# Patient Record
Sex: FEMALE | Race: OTHER | HISPANIC OR LATINO | Employment: OTHER | URBAN - METROPOLITAN AREA
[De-identification: names, ages, dates, MRNs, and addresses within clinical notes are randomized per-mention and may not be internally consistent; named-entity substitution may affect disease eponyms.]

---

## 2017-01-11 ENCOUNTER — TELEPHONE (OUTPATIENT)
Dept: INTERNAL MEDICINE | Facility: MEDICAL CENTER | Age: 67
End: 2017-01-11

## 2017-01-11 NOTE — TELEPHONE ENCOUNTER
----- Message from Nadya Woods sent at 1/11/2017  2:37 PM PST -----  Regarding: NICOLE - LAB ORDER REQUEST  Contact: [phone]  Patient has an appt in Feb and would like to have routine labs done prior to appt. Please mail orders to home address.

## 2017-01-12 NOTE — TELEPHONE ENCOUNTER
Phone Number Called: [phone]    Message: Called pt, no answer, left message with info as stated below in Dr. Brown's note.    Left Message for patient to call back: yes, with any questions.

## 2017-02-10 ENCOUNTER — OFFICE VISIT (OUTPATIENT)
Dept: INTERNAL MEDICINE | Facility: MEDICAL CENTER | Age: 67
End: 2017-02-10
Payer: MEDICARE

## 2017-02-10 VITALS
TEMPERATURE: 98.4 F | SYSTOLIC BLOOD PRESSURE: 122 MMHG | HEIGHT: 61 IN | OXYGEN SATURATION: 96 % | WEIGHT: 117.25 LBS | DIASTOLIC BLOOD PRESSURE: 76 MMHG | BODY MASS INDEX: 22.14 KG/M2 | HEART RATE: 75 BPM

## 2017-02-10 DIAGNOSIS — L50.9 URTICARIA: ICD-10-CM

## 2017-02-10 DIAGNOSIS — Z13.220 LIPID SCREENING: ICD-10-CM

## 2017-02-10 DIAGNOSIS — Z13.1 DIABETES MELLITUS SCREENING: ICD-10-CM

## 2017-02-10 DIAGNOSIS — K21.9 GASTROESOPHAGEAL REFLUX DISEASE WITHOUT ESOPHAGITIS: ICD-10-CM

## 2017-02-10 DIAGNOSIS — F17.200 TOBACCO DEPENDENCE: ICD-10-CM

## 2017-02-10 PROCEDURE — 99214 OFFICE O/P EST MOD 30 MIN: CPT | Performed by: INTERNAL MEDICINE

## 2017-02-10 PROCEDURE — 4040F PNEUMOC VAC/ADMIN/RCVD: CPT | Performed by: INTERNAL MEDICINE

## 2017-02-10 PROCEDURE — 3017F COLORECTAL CA SCREEN DOC REV: CPT | Performed by: INTERNAL MEDICINE

## 2017-02-10 PROCEDURE — G8432 DEP SCR NOT DOC, RNG: HCPCS | Performed by: INTERNAL MEDICINE

## 2017-02-10 PROCEDURE — 4004F PT TOBACCO SCREEN RCVD TLK: CPT | Mod: 8P | Performed by: INTERNAL MEDICINE

## 2017-02-10 PROCEDURE — G8419 CALC BMI OUT NRM PARAM NOF/U: HCPCS | Performed by: INTERNAL MEDICINE

## 2017-02-10 PROCEDURE — 1101F PT FALLS ASSESS-DOCD LE1/YR: CPT | Performed by: INTERNAL MEDICINE

## 2017-02-10 PROCEDURE — 3014F SCREEN MAMMO DOC REV: CPT | Performed by: INTERNAL MEDICINE

## 2017-02-10 PROCEDURE — G8482 FLU IMMUNIZE ORDER/ADMIN: HCPCS | Performed by: INTERNAL MEDICINE

## 2017-02-10 RX ORDER — OMEPRAZOLE 20 MG/1
20 CAPSULE, DELAYED RELEASE ORAL DAILY
Qty: 30 CAP | Refills: 6 | Status: SHIPPED | OUTPATIENT
Start: 2017-02-10 | End: 2017-09-20 | Stop reason: SDUPTHER

## 2017-02-10 NOTE — PROGRESS NOTES
Chief Complaint   Patient presents with   • Abdominal Pain     Mid-upper abd [id number]. Sometimes/barely       HISTORY OF PRESENT ILLNESS: Patient is a 66 y.o. female established patient who presents along with her sister today for the following.      1. Gastroesophageal reflux disease without esophagitis  Complaint of stomach bloating and upset pretty often on daily basis. The pain goes to the back from midepigastric region. Denies having any blood in stools or black stools. Has been taking ranitidine when needed for heartburn. Does drink wine on a regular basis and smokes. Does not cut down spicy foods in general    2. Urticaria  Complaint of rash on her arms occasionally and itchy. Does not know what's the predisposing reason. Has not maintained a food log. No prior history of rash. Denies any new perfumes, over the counter medications, or detergents.    3. Lipid screening  Would like to get cholesterol checked and other labs    4. Diabetes mellitus screening  Family history of diabetes in her brother and sister and would like to get screened for diabetes    5. Tobacco dependence  Continues to smoke half a pack a day. Denies any history of weight loss or lung cancer in the family.      Past Medical History   Diagnosis Date   • Foot pain, left    • Menopause    • UTI (urinary tract infection)    • Cervical radiculopathy    • Insomnia, psychophysiological    • High risk sexual behavior    • Tobacco dependence        Patient Active Problem List    Diagnosis Date Noted   • Urticaria 02/10/2017   • Chronic cervical radiculopathy 11/16/2016   • Gastroesophageal reflux disease without esophagitis 11/16/2016   • Insomnia 05/10/2016   • Tobacco dependence 05/10/2016       Allergies:Review of patient's allergies indicates no known allergies.    Current Outpatient Prescriptions   Medication Sig Dispense Refill   • aspirin 81 MG tablet Take 81 mg by mouth every day.     • omeprazole (PRILOSEC) 20 MG delayed-release capsule  "Take 1 Cap by mouth every day. 30 Cap 6   • Omega-3 Fatty Acids (FISH OIL) 1000 MG Cap capsule Take 1,000 mg by mouth every day.     • diphenhydrAMINE (BENADRYL) 25 MG Tab Take 25 mg by mouth every 6 hours as needed for Sleep.     • FLUZONE HIGH-DOSE 0.5 ML Suspension Prefilled Syringe injection TO BE ADMINISTERED BY PHARMACIST FOR IMMUNIZATION  0     No current facility-administered medications for this visit.       Social History   Substance Use Topics   • Smoking status: Current Every Day Smoker -- 0.25 packs/day for 4 years     Types: Cigarettes   • Smokeless tobacco: Never Used   • Alcohol Use: 1.2 oz/week     2 Glasses of wine per week       History reviewed. No pertinent family history.      Review of Systems    Patient denies Fevers/chills/nausea/vomiting/chest pain/sob/blood in stools/black stools/blood in urine. See HPI    Exam:  Blood pressure 122/76, pulse 75, temperature 36.9 °C (98.4 °F), height 1.537 m (5' 0.51\"), weight 53.184 kg (117 lb 4 oz), SpO2 96 %. Body mass index is 22.51 kg/(m^2).  Constitutional:  NAD, well appearing.  HEENT:   NC/AT  Cardiovascular: RRR.   No m/r/g. No carotid bruits.       Lungs:   CTAB, no w/r/r, no respiratory distress.  Abdomen: Soft, NT/ND + BS, no masses, no suprapubic tenderness, no hepatomegaly.  Extremities:  2+ DP and radial pulses bilaterally.  No c/c/e.  Skin:  Warm and dry.    Neurologic: Alert & oriented x 3, CN II-XII grossly intact, strength and sensation grossly intact.  No focal deficits noted.  Psychiatric:  Affect normal, mood normal, judgment normal.    Assessment/Plan:     1. Gastroesophageal reflux disease without esophagitis  Discussed about cutting down on spicy foods, chocolate, Stewart and stopping smoking. Patient is planning to try and meanwhile try omeprazole every morning on empty stomach. Reassess next visit  - CBC WITH DIFFERENTIAL; Future  - omeprazole (PRILOSEC) 20 MG delayed-release capsule; Take 1 Cap by mouth every day.  Dispense: 30 Cap; " Refill: 6    2. Urticaria  Currently no rash visible and occasional on and off symptoms of urticaria per patient's description. Discussed about trying Claritin at night on daily basis for calming down the symptoms and maintain a food log to see what's causing urticaria.  - CBC WITH DIFFERENTIAL; Future    3. Lipid screening  Tries to eat healthy and stays active but does not have any new problems with heart disease in the family.  - LIPID PROFILE; Future    4. Diabetes mellitus screening  Family history of diabetes strongly but herself no new symptoms of polyuria or polydipsia but we will screen. Fasting blood sugars  - COMP METABOLIC PANEL; Future    5. Tobacco dependence  Discussed about smoking cessation and over-the-counter nicotine patches and if she is interested  We'll try Wellbutrin to help smoking cessation or Chantix when she is ready.      Followup: Return in about 4 months (around 6/10/2017).    Please note that this dictation was created using voice recognition software. I have made every reasonable attempt to correct obvious errors, but I expect that there are errors of grammar and possibly content that I did not discover before finalizing the note.

## 2017-02-10 NOTE — PATIENT INSTRUCTIONS
Food Choices for Gastroesophageal Reflux Disease, Adult  When you have gastroesophageal reflux disease (GERD), the foods you eat and your eating habits are very important. Choosing the right foods can help ease the discomfort of GERD.  WHAT GENERAL GUIDELINES DO I NEED TO FOLLOW?  Choose fruits, vegetables, whole grains, low-fat dairy products, and low-fat meat, fish, and poultry.  Limit fats such as oils, salad dressings, butter, nuts, and avocado.  Keep a food diary to identify foods that cause symptoms.  Avoid foods that cause reflux. These may be different for different people.  Eat frequent small meals instead of three large meals each day.  Eat your meals slowly, in a relaxed setting.  Limit fried foods.  Cook foods using methods other than frying.  Avoid drinking alcohol.  Avoid drinking large amounts of liquids with your meals.  Avoid bending over or lying down until 2-3 hours after eating.  WHAT FOODS ARE NOT RECOMMENDED?  The following are some foods and drinks that may worsen your symptoms:  Vegetables  Tomatoes. Tomato juice. Tomato and spaghetti sauce. Chili peppers. Onion and garlic. Horseradish.  Fruits  Oranges, grapefruit, and lemon (fruit and juice).  Meats  High-fat meats, fish, and poultry. This includes hot dogs, ribs, ham, sausage, salami, and muñoz.  Dairy  Whole milk and chocolate milk. Sour cream. Cream. Butter. Ice cream. Cream cheese.   Beverages  Coffee and tea, with or without caffeine. Carbonated beverages or energy drinks.  Condiments  Hot sauce. Barbecue sauce.   Sweets/Desserts  Chocolate and cocoa. Donuts. Peppermint and spearmint.  Fats and Oils  High-fat foods, including French fries and potato chips.  Other  Vinegar. Strong spices, such as black pepper, white pepper, red pepper, cayenne, copeland powder, cloves, ginger, and chili powder.  The items listed above may not be a complete list of foods and beverages to avoid. Contact your dietitian for more information.     This  information is not intended to replace advice given to you by your health care provider. Make sure you discuss any questions you have with your health care provider.     Document Released: 12/18/2006 Document Revised: 01/08/2016 Document Reviewed: 10/22/2014  [employer] Interactive Patient Education ©2016 [employer] Inc.   Horseradish.  Fruits  Oranges, grapefruit, and lemon (fruit and juice).  Meats  High-fat meats, fish, and poultry. This includes hot dogs, ribs, ham, sausage, salami, and muñoz.  Dairy  Whole milk and chocolate milk. Sour cream. Cream. Butter. Ice cream. Cream cheese.   Beverages  Coffee and tea, with or without caffeine. Carbonated beverages or energy drinks.  Condiments  Hot sauce. Barbecue sauce.   Sweets/Desserts  Chocolate and cocoa. Donuts. Peppermint and spearmint.  Fats and Oils  High-fat foods, including French fries and potato chips.  Other  Vinegar. Strong spices, such as black pepper, white pepper, red pepper, cayenne, copeland powder, cloves, ginger, and chili powder.  The items listed above may not be a complete list of foods and beverages to avoid. Contact your dietitian for more information.     This information is not intended to replace advice given to you by your health care provider. Make sure you discuss any questions you have with your health care provider.     Document Released: 12/18/2006 Document Revised: 01/08/2016 Document Reviewed: 10/22/2014  [employer] Patient Education ©2016 Elsevier Inc.

## 2017-02-10 NOTE — MR AVS SNAPSHOT
"        Yuly Wangari   2/10/2017 3:20 PM   Office Visit   MRN: [id number]    Department:  [facility] Med - Internal Med   Dept Phone:  [phone]    Description:  Female : 1950   Provider:  Génessi Brown M.D.           Reason for Visit     Abdominal Pain Mid-upper abd [id number]. Sometimes/barely      Allergies as of 2/10/2017     No Known Allergies      You were diagnosed with     Gastroesophageal reflux disease without esophagitis   [517997]       Urticaria   [9902061]       Lipid screening   [632608]       Diabetes mellitus screening   [460188]       Tobacco dependence   [168036]         Vital Signs     Blood Pressure Pulse Temperature Height Weight Body Mass Index    122/76 mmHg 75 36.9 °C (98.4 °F) 1.537 m (5' 0.51\") 53.184 kg (117 lb 4 oz) 22.51 kg/m2    Oxygen Saturation Smoking Status                96% Current Every Day Smoker          Basic Information     Date Of Birth Sex Race Ethnicity Preferred Language    1950 Female Unknown Unknown English      Your appointments     2017  3:20 PM   Established Patient with Génesis Brown M.D.   [facility] Med - Internal Medicine (--)    [address] [place] [address] [id number] [phone]           You will be receiving a confirmation call a few days before your appointment from our automated call confirmation system.              Problem List              ICD-10-CM Priority Class Noted - Resolved    Insomnia G47.00   5/10/2016 - Present    Tobacco dependence F17.200   5/10/2016 - Present    Chronic cervical radiculopathy M54.12   2016 - Present    Gastroesophageal reflux disease without esophagitis K21.9   2016 - Present    Urticaria L50.9   2/10/2017 - Present      Health Maintenance        Date Due Completion Dates    IMM DTaP/Tdap/Td Vaccine (1 - Tdap) 1967 ---    PAP SMEAR 1969 ---    IMM ZOSTER VACCINE 2008 ---    BONE DENSITY 2013 ---    MAMMOGRAM 2016    IMM PNEUMOCOCCAL " 65+ (ADULT) LOW/MEDIUM RISK SERIES (2 of 2 - PPSV23) 11/16/2017 11/16/2016    COLONOSCOPY 11/1/2023 11/1/2013 (Done)    Override on 11/1/2013: Done (in perla per pt)            Current Immunizations     13-VALENT PCV PREVNAR 11/16/2016    Influenza Vaccine Quad Inj (Preserved) 10/11/2016      Below and/or attached are the medications your provider expects you to take. Review all of your home medications and newly ordered medications with your provider and/or pharmacist. Follow medication instructions as directed by your provider and/or pharmacist. Please keep your medication list with you and share with your provider. Update the information when medications are discontinued, doses are changed, or new medications (including over-the-counter products) are added; and carry medication information at all times in the event of emergency situations     Allergies:  No Known Allergies          Medications  Valid as of: February 10, 2017 -  3:53 PM    Generic Name Brand Name Tablet Size Instructions for use    Aspirin (Tab) aspirin 81 MG Take 81 mg by mouth every day.        DiphenhydrAMINE HCl (Tab) BENADRYL 25 MG Take 25 mg by mouth every 6 hours as needed for Sleep.        Influenza Vac Split High-Dose (Suspension Prefilled Syringe) FLUZONE HIGH-DOSE 0.5 ML TO BE ADMINISTERED BY PHARMACIST FOR IMMUNIZATION        Omega-3 Fatty Acids (Cap) fish oil 1000 MG Take 1,000 mg by mouth every day.        Omeprazole (CAPSULE DELAYED RELEASE) PRILOSEC 20 MG Take 1 Cap by mouth every day.        .                 Medicines prescribed today were sent to:     [facility][id number] - [place] - [address] [place] [address]    Phone: [phone] Fax: [phone]    Open 24 Hours?: No      Medication refill instructions:       If your prescription bottle indicates you have medication refills left, it is not necessary to call your provider’s office. Please contact your pharmacy and they will refill your medication.    If your  prescription bottle indicates you do not have any refills left, you may request refills at any time through one of the following ways: The online Zero9 system (except Urgent Care), by calling your provider’s office, or by asking your pharmacy to contact your provider’s office with a refill request. Medication refills are processed only during regular business hours and may not be available until the next business day. Your provider may request additional information or to have a follow-up visit with you prior to refilling your medication.   *Please Note: Medication refills are assigned a new Rx number when refilled electronically. Your pharmacy may indicate that no refills were authorized even though a new prescription for the same medication is available at the pharmacy. Please request the medicine by name with the pharmacy before contacting your provider for a refill.        Your To Do List     Future Labs/Procedures Complete By Expires    CBC WITH DIFFERENTIAL  As directed 2/10/2018    COMP METABOLIC PANEL  As directed 2/10/2018    LIPID PROFILE  As directed 2/10/2018      Instructions    Food Choices for Gastroesophageal Reflux Disease, Adult  When you have gastroesophageal reflux disease (GERD), the foods you eat and your eating habits are very important. Choosing the right foods can help ease the discomfort of GERD.  WHAT GENERAL GUIDELINES DO I NEED TO FOLLOW?  Choose fruits, vegetables, whole grains, low-fat dairy products, and low-fat meat, fish, and poultry.  Limit fats such as oils, salad dressings, butter, nuts, and avocado.  Keep a food diary to identify foods that cause symptoms.  Avoid foods that cause reflux. These may be different for different people.  Eat frequent small meals instead of three large meals each day.  Eat your meals slowly, in a relaxed setting.  Limit fried foods.  Cook foods using methods other than frying.  Avoid drinking alcohol.  Avoid drinking large amounts of liquids with  your meals.  Avoid bending over or lying down until 2-3 hours after eating.  WHAT FOODS ARE NOT RECOMMENDED?  The following are some foods and drinks that may worsen your symptoms:  Vegetables  Tomatoes. Tomato juice. Tomato and spaghetti sauce. Chili peppers. Onion and garlic. Horseradish.  Fruits  Oranges, grapefruit, and lemon (fruit and juice).  Meats  High-fat meats, fish, and poultry. This includes hot dogs, ribs, ham, sausage, salami, and muñoz.  Dairy  Whole milk and chocolate milk. Sour cream. Cream. Butter. Ice cream. Cream cheese.   Beverages  Coffee and tea, with or without caffeine. Carbonated beverages or energy drinks.  Condiments  Hot sauce. Barbecue sauce.   Sweets/Desserts  Chocolate and cocoa. Donuts. Peppermint and spearmint.  Fats and Oils  High-fat foods, including French fries and potato chips.  Other  Vinegar. Strong spices, such as black pepper, white pepper, red pepper, cayenne, copeland powder, cloves, ginger, and chili powder.  The items listed above may not be a complete list of foods and beverages to avoid. Contact your dietitian for more information.     This information is not intended to replace advice given to you by your health care provider. Make sure you discuss any questions you have with your health care provider.     Document Released: 12/18/2006 Document Revised: 01/08/2016 Document Reviewed: 10/22/2014  [employer] Interactive Patient Education ©2016 [employer]   Horseradish.  Fruits  Oranges, grapefruit, and lemon (fruit and juice).  Meats  High-fat meats, fish, and poultry. This includes hot dogs, ribs, ham, sausage, salami, and muñoz.  Dairy  Whole milk and chocolate milk. Sour cream. Cream. Butter. Ice cream. Cream cheese.   Beverages  Coffee and tea, with or without caffeine. Carbonated beverages or energy drinks.  Condiments  Hot sauce. Barbecue sauce.   Sweets/Desserts  Chocolate and cocoa. Donuts. Peppermint and spearmint.  Fats and Oils  High-fat foods, including  French fries and potato chips.  Other  Vinegar. Strong spices, such as black pepper, white pepper, red pepper, cayenne, copeland powder, cloves, ginger, and chili powder.  The items listed above may not be a complete list of foods and beverages to avoid. Contact your dietitian for more information.     This information is not intended to replace advice given to you by your health care provider. Make sure you discuss any questions you have with your health care provider.     Document Released: 12/18/2006 Document Revised: 01/08/2016 Document Reviewed: 10/22/2014  [employer] Interactive Patient Education ©2016 Elsevier Inc.            5minutes Access Code: [email or web address]  Expires: 3/12/2017  2:39 PM    5minutes  A secure, online tool to manage your health information     [employer] 5minutes® is a secure, online tool that connects you to your personalized health information from the privacy of your home -- day or night - making it very easy for you to manage your healthcare. Once the activation process is completed, you can even access your medical information using the 5minutes ezequiel, which is available for free in the Apple Ezequiel store or Google Play store.     5minutes provides the following levels of access (as shown below):   My Chart Features   [facility] Doctor [facility]  Specialists [facility]  Urgent  Care Non-[facility]  Primary Care  Doctor   Email your healthcare team securely and privately 24/7 X X X    Manage appointments: schedule your next appointment; view details of past/upcoming appointments X      Request prescription refills. X      View recent personal medical records, including lab and immunizations X X X X   View health record, including health history, allergies, medications X X X X   Read reports about your outpatient visits, procedures, consult and ER notes X X X X   See your discharge summary, which is a recap of your hospital and/or ER visit that includes your diagnosis, lab results, and care plan.  X X       How to register for [employer]:  1. Go to  [email or web address].  2. Click on the Sign Up Now box, which takes you to the New Member Sign Up page. You will need to provide the following information:  a. Enter your [employer] Access Code exactly as it appears at the top of this page. (You will not need to use this code after you’ve completed the sign-up process. If you do not sign up before the expiration date, you must request a new code.)   b. Enter your date of birth.   c. Enter your home email address.   d. Click Submit, and follow the next screen’s instructions.  3. Create a YellowHammert ID. This will be your [employer] login ID and cannot be changed, so think of one that is secure and easy to remember.  4. Create a [employer] password. You can change your password at any time.  5. Enter your Password Reset Question and Answer. This can be used at a later time if you forget your password.   6. Enter your e-mail address. This allows you to receive e-mail notifications when new information is available in [employer]is.  7. Click Sign Up. You can now view your health information.    For assistance activating your [employer] account, call [phone]

## 2017-09-20 DIAGNOSIS — K21.9 GASTROESOPHAGEAL REFLUX DISEASE WITHOUT ESOPHAGITIS: ICD-10-CM

## 2017-09-20 RX ORDER — OMEPRAZOLE 20 MG/1
20 CAPSULE, DELAYED RELEASE ORAL DAILY
Qty: 30 CAP | Refills: 0 | Status: SHIPPED | OUTPATIENT
Start: 2017-09-20 | End: 2017-10-13 | Stop reason: SDUPTHER

## 2017-09-20 NOTE — TELEPHONE ENCOUNTER
Last seen: 02/10/17 by Dr. Brown  Next appt: 11/03/17 with Dr. Brown    Was the patient seen in the last year in this department? Yes   Does patient have an active prescription for medications requested? No   Received Request Via: Pharmacy

## 2017-10-13 DIAGNOSIS — K21.9 GASTROESOPHAGEAL REFLUX DISEASE WITHOUT ESOPHAGITIS: ICD-10-CM

## 2017-10-17 RX ORDER — OMEPRAZOLE 20 MG/1
20 CAPSULE, DELAYED RELEASE ORAL DAILY
Qty: 30 CAP | Refills: 0 | Status: SHIPPED | OUTPATIENT
Start: 2017-10-17 | End: 2017-11-03

## 2017-10-31 ENCOUNTER — HOSPITAL ENCOUNTER (OUTPATIENT)
Dept: LAB | Facility: MEDICAL CENTER | Age: 67
End: 2017-10-31
Attending: INTERNAL MEDICINE
Payer: MEDICARE

## 2017-10-31 DIAGNOSIS — L50.9 URTICARIA: ICD-10-CM

## 2017-10-31 DIAGNOSIS — Z13.220 LIPID SCREENING: ICD-10-CM

## 2017-10-31 DIAGNOSIS — Z13.1 DIABETES MELLITUS SCREENING: ICD-10-CM

## 2017-10-31 DIAGNOSIS — K21.9 GASTROESOPHAGEAL REFLUX DISEASE WITHOUT ESOPHAGITIS: ICD-10-CM

## 2017-10-31 LAB
ALBUMIN SERPL BCP-MCNC: 4.3 G/DL (ref 3.2–4.9)
ALBUMIN/GLOB SERPL: 1.5 G/DL
ALP SERPL-CCNC: 40 U/L (ref 30–99)
ALT SERPL-CCNC: 12 U/L (ref 2–50)
ANION GAP SERPL CALC-SCNC: 7 MMOL/L (ref 0–11.9)
AST SERPL-CCNC: 20 U/L (ref 12–45)
BASOPHILS # BLD AUTO: 0.8 % (ref 0–1.8)
BASOPHILS # BLD: 0.05 K/UL (ref 0–0.12)
BILIRUB SERPL-MCNC: 0.7 MG/DL (ref 0.1–1.5)
BUN SERPL-MCNC: 5 MG/DL (ref 8–22)
CALCIUM SERPL-MCNC: 9.4 MG/DL (ref 8.5–10.5)
CHLORIDE SERPL-SCNC: 94 MMOL/L (ref 96–112)
CO2 SERPL-SCNC: 28 MMOL/L (ref 20–33)
CREAT SERPL-MCNC: 0.48 MG/DL (ref 0.5–1.4)
EOSINOPHIL # BLD AUTO: 0.15 K/UL (ref 0–0.51)
EOSINOPHIL NFR BLD: 2.4 % (ref 0–6.9)
ERYTHROCYTE [DISTWIDTH] IN BLOOD BY AUTOMATED COUNT: 43.8 FL (ref 35.9–50)
GFR SERPL CREATININE-BSD FRML MDRD: >60 ML/MIN/1.73 M 2
GLOBULIN SER CALC-MCNC: 2.8 G/DL (ref 1.9–3.5)
GLUCOSE SERPL-MCNC: 88 MG/DL (ref 65–99)
HCT VFR BLD AUTO: 40.1 % (ref 37–47)
HGB BLD-MCNC: 13.3 G/DL (ref 12–16)
IMM GRANULOCYTES # BLD AUTO: 0.02 K/UL (ref 0–0.11)
IMM GRANULOCYTES NFR BLD AUTO: 0.3 % (ref 0–0.9)
LYMPHOCYTES # BLD AUTO: 1.84 K/UL (ref 1–4.8)
LYMPHOCYTES NFR BLD: 29.3 % (ref 22–41)
MCH RBC QN AUTO: 30.2 PG (ref 27–33)
MCHC RBC AUTO-ENTMCNC: 33.2 G/DL (ref 33.6–35)
MCV RBC AUTO: 90.9 FL (ref 81.4–97.8)
MONOCYTES # BLD AUTO: 0.53 K/UL (ref 0–0.85)
MONOCYTES NFR BLD AUTO: 8.5 % (ref 0–13.4)
NEUTROPHILS # BLD AUTO: 3.68 K/UL (ref 2–7.15)
NEUTROPHILS NFR BLD: 58.7 % (ref 44–72)
NRBC # BLD AUTO: 0 K/UL
NRBC BLD AUTO-RTO: 0 /100 WBC
PLATELET # BLD AUTO: 249 K/UL (ref 164–446)
PMV BLD AUTO: 10.7 FL (ref 9–12.9)
POTASSIUM SERPL-SCNC: 4.1 MMOL/L (ref 3.6–5.5)
PROT SERPL-MCNC: 7.1 G/DL (ref 6–8.2)
RBC # BLD AUTO: 4.41 M/UL (ref 4.2–5.4)
SODIUM SERPL-SCNC: 129 MMOL/L (ref 135–145)
WBC # BLD AUTO: 6.3 K/UL (ref 4.8–10.8)

## 2017-10-31 PROCEDURE — 85025 COMPLETE CBC W/AUTO DIFF WBC: CPT

## 2017-10-31 PROCEDURE — 80053 COMPREHEN METABOLIC PANEL: CPT

## 2017-10-31 PROCEDURE — 36415 COLL VENOUS BLD VENIPUNCTURE: CPT

## 2017-11-03 ENCOUNTER — OFFICE VISIT (OUTPATIENT)
Dept: INTERNAL MEDICINE | Facility: MEDICAL CENTER | Age: 67
End: 2017-11-03
Payer: MEDICARE

## 2017-11-03 VITALS
OXYGEN SATURATION: 97 % | SYSTOLIC BLOOD PRESSURE: 138 MMHG | TEMPERATURE: 97.4 F | WEIGHT: 116 LBS | DIASTOLIC BLOOD PRESSURE: 68 MMHG | HEIGHT: 61 IN | BODY MASS INDEX: 21.9 KG/M2 | HEART RATE: 82 BPM

## 2017-11-03 DIAGNOSIS — F17.200 TOBACCO DEPENDENCE: ICD-10-CM

## 2017-11-03 DIAGNOSIS — R53.83 OTHER FATIGUE: Primary | ICD-10-CM

## 2017-11-03 DIAGNOSIS — Z78.0 ASYMPTOMATIC MENOPAUSAL STATE: ICD-10-CM

## 2017-11-03 DIAGNOSIS — Z12.39 SCREENING FOR BREAST CANCER: ICD-10-CM

## 2017-11-03 DIAGNOSIS — Z13.220 SCREENING FOR LIPID DISORDERS: ICD-10-CM

## 2017-11-03 DIAGNOSIS — Z23 NEED FOR VACCINATION: ICD-10-CM

## 2017-11-03 DIAGNOSIS — K21.9 GASTROESOPHAGEAL REFLUX DISEASE WITHOUT ESOPHAGITIS: ICD-10-CM

## 2017-11-03 PROCEDURE — 90732 PPSV23 VACC 2 YRS+ SUBQ/IM: CPT | Performed by: INTERNAL MEDICINE

## 2017-11-03 PROCEDURE — 99214 OFFICE O/P EST MOD 30 MIN: CPT | Mod: 25 | Performed by: INTERNAL MEDICINE

## 2017-11-03 PROCEDURE — G0009 ADMIN PNEUMOCOCCAL VACCINE: HCPCS | Performed by: INTERNAL MEDICINE

## 2017-11-03 RX ORDER — RANITIDINE 150 MG/1
150 TABLET ORAL 2 TIMES DAILY PRN
Qty: 60 TAB | Refills: 11 | Status: SHIPPED | OUTPATIENT
Start: 2017-11-03 | End: 2018-09-05

## 2017-11-03 ASSESSMENT — PATIENT HEALTH QUESTIONNAIRE - PHQ9: CLINICAL INTERPRETATION OF PHQ2 SCORE: 0

## 2017-11-03 NOTE — PROGRESS NOTES
date  Chief Complaint   Patient presents with   • Labs Only     Lab Work Review       HISTORY OF PRESENT ILLNESS: Patient is a 67 y.o. female established patient who presents today for the following.      1. Other fatigue  Patient feeling tired and fatigued for the last several months. Denies having any depression. 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 Denies any fevers or chills nausea vomiting.    2. Gastroesophageal reflux disease without esophagitis  Has been on omeprazole in daily basis for her acid reflux. States she likes coffee as well as spicy foods. Denies any black stools or bloody stool    3. Tobacco dependence  Patient has tried quitting smoking 2 months ago and has minimized smoking to only 2 cigarettes a day now from one fourth pack a day. Denies having any major cough or breathing issues    4. Screening for breast cancer  Has not been mammogram in last one year. Denies any breast masses    5. Screening for lipid disorders  Interest in getting her cholesterol rechecked again.    6. Asymptomatic menopausal state  Patient had not had DEXA scan in the recent years and remains in menopausal state.    7. Need for vaccination  Already got her flu shot but not the Pneumovax. She did receive Prevnar 13 last year      Past Medical History:   Diagnosis Date   • Cervical radiculopathy    • Foot pain, left    • High risk sexual behavior    • Insomnia, psychophysiological    • Menopause    • Tobacco dependence    • UTI (urinary tract infection)        Patient Active Problem List    Diagnosis Date Noted   • Urticaria 02/10/2017   • Chronic cervical radiculopathy 11/16/2016   • Gastroesophageal reflux disease without esophagitis 11/16/2016   • Insomnia 05/10/2016   • Tobacco dependence 05/10/2016       Allergies:Review of patient's allergies indicates no known allergies.    Current Outpatient Prescriptions   Medication Sig Dispense Refill   • ranitidine (ZANTAC)  "150 MG Tab Take 1 Tab by mouth 2 times a day as needed for Heartburn. 60 Tab 11   • aspirin 81 MG tablet Take 81 mg by mouth every day.     • Omega-3 Fatty Acids (FISH OIL) 1000 MG Cap capsule Take 1,000 mg by mouth every day.     • diphenhydrAMINE (BENADRYL) 25 MG Tab Take 25 mg by mouth every 6 hours as needed for Sleep.     • FLUZONE HIGH-DOSE 0.5 ML Suspension Prefilled Syringe injection TO BE ADMINISTERED BY PHARMACIST FOR IMMUNIZATION  0     No current facility-administered medications for this visit.        Social History   Substance Use Topics   • Smoking status: Current Every Day Smoker     Packs/day: 0.25     Years: 40.00     Types: Cigarettes   • Smokeless tobacco: Never Used   • Alcohol use 1.2 oz/week     2 Glasses of wine per week       History reviewed. No pertinent family history.      Review of Systems   Patient denies Fevers/chills/nausea/vomiting/chest pain/sob/blood in stools/black stools/blood in urine.all other systems are reviewed See HPI    Exam:  Blood pressure 138/68, pulse 82, temperature 36.3 °C (97.4 °F), height 1.537 m (5' 0.51\"), weight 52.6 kg (116 lb), SpO2 97 %. Body mass index is 22.27 kg/m².  Constitutional:  NAD, well appearing.  HEENT:   NC/AT  Cardiovascular: RRR.   No m/r/g. No carotid bruits.       Lungs:   CTAB, no w/r/r, no respiratory distress.  Abdomen: Soft, NT/ND + BS, no masses, no suprapubic tenderness, no hepatomegaly.  Extremities:  2+ DP and radial pulses bilaterally.  No c/c/e.  Skin:  Warm and dry.    Neurologic: Alert & oriented x 3, CN II-XII grossly intact, strength and sensation grossly intact.  No focal deficits noted.  Psychiatric:  Affect normal, mood normal, judgment normal.    Assessment/Plan:     1. Other fatigue  CBC reviewed and her hemoglobin within normal. There are minor electrolyte disturbances but nothing significant to be suggestive of fatigue. Check thyroid function B12 and vitamin D levels and reassess  - TSH WITH REFLEX TO FT4; Future  - " VITAMIN B12; Future  - VITAMIN D,25 HYDROXY; Future    2. Gastroesophageal reflux disease without esophagitis  Educated patient about the risk of fall Alzheimer's and pneumonia with a three-day use of omeprazole given her age. Discussed about cutting on spicy foods and chocolate and alcohol to decrease the acid reflux. Advised to use Zantac at times and when acid reflux worse she can use omeprazole when necessary basis.  - ranitidine (ZANTAC) 150 MG Tab; Take 1 Tab by mouth 2 times a day as needed for Heartburn.  Dispense: 60 Tab; Refill: 11    3. Tobacco dependence  Discussed about completely quitting smoking and patient willing to try. We also had a lengthy discussion about risk for osteoporosis and lung cancer and smoking. Screening for osteoporosis  - DS-BONE DENSITY STUDY (DEXA); Future    4. Screening for breast cancer  Mammogram order given to screen for breast cancer  - MA-MAMMO SCREENING BILAT W/DARLING W/CAD; Future    5. Screening for lipid disorders  Check for lipid profile based on patient's request but last year patient has been within normal  - LIPID PROFILE; Future    6. Asymptomatic menopausal state  Check bone density scan given her age and risk factors including smoking  - DS-BONE DENSITY STUDY (DEXA); Future    7. Need for vaccination  Pneumovax given today in the office  - PneumoVax PPV23 =>1yo      All imaging results and lab results and consult notes are reviewed at this visit.  Followup: Return in about 1 year (around 11/3/2018).    Please note that this dictation was created using voice recognition software. I have made every reasonable attempt to correct obvious errors, but I expect that there are errors of grammar and possibly content that I did not discover before finalizing the note.

## 2017-11-03 NOTE — PATIENT INSTRUCTIONS
Tobacco Use Disorder  Tobacco use disorder (TUD) is a mental disorder. It is the long-term use of tobacco in spite of related health problems or difficulty with normal life activities. Tobacco is most commonly smoked as cigarettes and less commonly as cigars or pipes. Smokeless chewing tobacco and snuff are also popular. People with TUD get a feeling of extreme pleasure (euphoria) from using tobacco and have a desire to use it again and again. Repeated use of tobacco can cause problems.  The addictive effects of tobacco are due mainly to the ingredient nicotine. Nicotine also causes a rush of adrenaline (epinephrine) in the body. This leads to increased blood pressure, heart rate, and breathing rate. These changes may cause problems for people with high blood pressure, weak hearts, or lung disease. High doses of nicotine in children and pets can lead to seizures and death.   Tobacco contains a number of other unsafe chemicals. These chemicals are especially harmful when inhaled as smoke and can damage almost every organ in the body. Smokers live shorter lives than nonsmokers and are at risk of dying from a number of diseases and cancers. Tobacco smoke can also cause health problems for nonsmokers (due to inhaling secondhand smoke). Smoking is also a fire hazard.   TUD usually starts in the late teenage years and is most common in young adults between the ages of 18 and 25 years. People who start smoking earlier in life are more likely to continue smoking as adults. TUD is somewhat more common in men than women. People with TUD are at higher risk for using alcohol and other drugs of abuse.  RISK FACTORS  Risk factors for TUD include:   · Having family members with the disorder.  · Being around people who use tobacco.  · Having an existing mental health issue such as schizophrenia, depression, bipolar disorder, ADHD, or posttraumatic stress disorder (PTSD).  SIGNS AND SYMPTOMS   People with tobacco use disorder have  two or more of the following signs and symptoms within 12 months:   · Use of more tobacco over a longer period than intended.    · Not able to cut down or control tobacco use.    · A lot of time spent obtaining or using tobacco.    · Strong desire or urge to use tobacco (craving). Cravings may last for 6 months or longer after quitting.  · Use of tobacco even when use leads to major problems at work, school, or home.    · Use of tobacco even when use leads to relationship problems.    · Giving up or cutting down on important life activities because of tobacco use.    · Repeatedly using tobacco in situations where it puts you or others in physical danger, like smoking in bed.    · Use of tobacco even when it is known that a physical or mental problem is likely related to tobacco use.    ¨ Physical problems are numerous and may include chronic bronchitis, emphysema, lung and other cancers, gum disease, high blood pressure, heart disease, and stroke.    ¨ Mental problems caused by tobacco may include difficulty sleeping and anxiety.  · Need to use greater amounts of tobacco to get the same effect. This means you have developed a tolerance.    · Withdrawal symptoms as a result of stopping or rapidly cutting back use. These symptoms may last a month or more after quitting and include the following:    ¨ Depressed, anxious, or irritable mood.    ¨ Difficulty concentrating.      ¨ Increased appetite.  ¨ Restlessness or trouble sleeping.    ¨ Use of tobacco to avoid withdrawal symptoms.  DIAGNOSIS   Tobacco use disorder is diagnosed by your health care provider. A diagnosis may be made by:  · Your health care provider asking questions about your tobacco use and any problems it may be causing.  · A physical exam.  · Lab tests.  · You may be referred to a mental health professional or addiction specialist.  The severity of tobacco use disorder depends on the number of signs and symptoms you have:   · Mild--Two or three  symptoms.  · Moderate--Four or five symptoms.    · Severe--Six or more symptoms.    TREATMENT   Many people with tobacco use disorder are unable to quit on their own and need help. Treatment options include the following:  · Nicotine replacement therapy (NRT). NRT provides nicotine without the other harmful chemicals in tobacco. NRT gradually lowers the dosage of nicotine in the body and reduces withdrawal symptoms. NRT is available in over-the-counter forms (gum, lozenges, and skin patches) as well as prescription forms (mouth inhaler and nasal spray).  · Medicines. This may include:  ¨ Antidepressant medicine that may reduce nicotine cravings.  ¨ A medicine that acts on nicotine receptors in the brain to reduce cravings and withdrawal symptoms. It may also block the effects of tobacco in people with TUD who relapse.  · Counseling or talk therapy. A form of talk therapy called behavioral therapy is commonly used to treat people with TUD. Behavioral therapy looks at triggers for tobacco use, how to avoid them, and how to cope with cravings. It is most effective in person or by phone but is also available in self-help forms (books and Internet websites).  · Support groups. These provide emotional support, advice, and guidance for quitting tobacco.  The most effective treatment for TUD is usually a combination of medicine, talk therapy, and support groups.  HOME CARE INSTRUCTIONS  · Keep all follow-up visits as directed by your health care provider. This is important.  · Take medicines only as directed by your health care provider.  · Check with your health care provider before starting new prescription or over-the-counter medicines.  SEEK MEDICAL CARE IF:  · You are not able to take your medicines as prescribed.  · Treatment is not helping your TUD and your symptoms get worse.  SEEK IMMEDIATE MEDICAL CARE IF:  · You have serious thoughts about hurting yourself or others.  · You have trouble breathing, chest pain,  sudden weakness, or sudden numbness in part of your body.     This information is not intended to replace advice given to you by your health care provider. Make sure you discuss any questions you have with your health care provider.     Document Released: 08/23/2005 Document Revised: 01/08/2016 Document Reviewed: 02/13/2015  Elsevier Interactive Patient Education ©2016 [employer]

## 2017-11-13 DIAGNOSIS — K21.9 GASTROESOPHAGEAL REFLUX DISEASE WITHOUT ESOPHAGITIS: ICD-10-CM

## 2017-11-13 RX ORDER — OMEPRAZOLE 20 MG/1
20 CAPSULE, DELAYED RELEASE ORAL DAILY
Qty: 30 CAP | Refills: 2 | Status: SHIPPED | OUTPATIENT
Start: 2017-11-13 | End: 2018-02-14 | Stop reason: SDUPTHER

## 2017-11-13 NOTE — TELEPHONE ENCOUNTER
Last seen: 11/03/2017 by Dr. Brown  Next appt: None     Was the patient seen in the last year in this department? Yes   Does patient have an active prescription for medications requested? No   Received Request Via: Pharmacy

## 2018-01-08 ENCOUNTER — HOSPITAL ENCOUNTER (OUTPATIENT)
Dept: RADIOLOGY | Facility: MEDICAL CENTER | Age: 68
End: 2018-01-08
Attending: INTERNAL MEDICINE
Payer: MEDICARE

## 2018-01-08 DIAGNOSIS — Z78.0 ASYMPTOMATIC MENOPAUSAL STATE: ICD-10-CM

## 2018-01-08 DIAGNOSIS — F17.200 TOBACCO DEPENDENCE: ICD-10-CM

## 2018-01-08 DIAGNOSIS — Z12.39 SCREENING FOR BREAST CANCER: ICD-10-CM

## 2018-01-08 PROCEDURE — 77067 SCR MAMMO BI INCL CAD: CPT

## 2018-01-08 PROCEDURE — 77080 DXA BONE DENSITY AXIAL: CPT

## 2018-02-14 DIAGNOSIS — K21.9 GASTROESOPHAGEAL REFLUX DISEASE WITHOUT ESOPHAGITIS: ICD-10-CM

## 2018-02-14 RX ORDER — OMEPRAZOLE 20 MG/1
20 CAPSULE, DELAYED RELEASE ORAL DAILY
Qty: 90 CAP | Refills: 1 | Status: SHIPPED | OUTPATIENT
Start: 2018-02-14 | End: 2018-08-14 | Stop reason: SDUPTHER

## 2018-02-14 NOTE — TELEPHONE ENCOUNTER
Last seen: 11/03/17 by Dr. Brown  Next appt: None     Was the patient seen in the last year in this department? Yes   Does patient have an active prescription for medications requested? No   Received Request Via: Pharmacy

## 2018-08-14 DIAGNOSIS — K21.9 GASTROESOPHAGEAL REFLUX DISEASE WITHOUT ESOPHAGITIS: ICD-10-CM

## 2018-08-14 RX ORDER — OMEPRAZOLE 20 MG/1
20 CAPSULE, DELAYED RELEASE ORAL DAILY
Qty: 90 CAP | Refills: 0 | Status: SHIPPED | OUTPATIENT
Start: 2018-08-14 | End: 2018-11-18 | Stop reason: SDUPTHER

## 2018-08-14 NOTE — TELEPHONE ENCOUNTER
Last seen: 11/03/17 by Dr. Brown  Next appt: 09/05/18 with Dr. Brown    Was the patient seen in the last year in this department? Yes   Does patient have an active prescription for medications requested? No   Received Request Via: Pharmacy

## 2018-08-31 ENCOUNTER — HOSPITAL ENCOUNTER (OUTPATIENT)
Dept: LAB | Facility: MEDICAL CENTER | Age: 68
End: 2018-08-31
Attending: INTERNAL MEDICINE
Payer: MEDICARE

## 2018-08-31 DIAGNOSIS — Z13.220 SCREENING FOR LIPID DISORDERS: ICD-10-CM

## 2018-08-31 DIAGNOSIS — R53.83 OTHER FATIGUE: ICD-10-CM

## 2018-08-31 LAB
25(OH)D3 SERPL-MCNC: 23 NG/ML (ref 30–100)
CHOLEST SERPL-MCNC: 172 MG/DL (ref 100–199)
HDLC SERPL-MCNC: 41 MG/DL
LDLC SERPL CALC-MCNC: 102 MG/DL
TRIGL SERPL-MCNC: 146 MG/DL (ref 0–149)
TSH SERPL DL<=0.005 MIU/L-ACNC: 1.96 UIU/ML (ref 0.38–5.33)
VIT B12 SERPL-MCNC: 789 PG/ML (ref 211–911)

## 2018-08-31 PROCEDURE — 84443 ASSAY THYROID STIM HORMONE: CPT

## 2018-08-31 PROCEDURE — 80061 LIPID PANEL: CPT | Mod: GA

## 2018-08-31 PROCEDURE — 82306 VITAMIN D 25 HYDROXY: CPT | Mod: GA

## 2018-08-31 PROCEDURE — 36415 COLL VENOUS BLD VENIPUNCTURE: CPT

## 2018-08-31 PROCEDURE — 82607 VITAMIN B-12: CPT

## 2018-09-05 ENCOUNTER — OFFICE VISIT (OUTPATIENT)
Dept: INTERNAL MEDICINE | Facility: MEDICAL CENTER | Age: 68
End: 2018-09-05
Payer: MEDICARE

## 2018-09-05 VITALS
HEART RATE: 77 BPM | BODY MASS INDEX: 22.38 KG/M2 | DIASTOLIC BLOOD PRESSURE: 70 MMHG | HEIGHT: 60 IN | SYSTOLIC BLOOD PRESSURE: 140 MMHG | WEIGHT: 114 LBS | TEMPERATURE: 98.3 F | RESPIRATION RATE: 20 BRPM | OXYGEN SATURATION: 99 %

## 2018-09-05 DIAGNOSIS — K21.9 GASTROESOPHAGEAL REFLUX DISEASE WITHOUT ESOPHAGITIS: ICD-10-CM

## 2018-09-05 DIAGNOSIS — Z00.00 ENCOUNTER FOR MEDICARE ANNUAL WELLNESS EXAM: ICD-10-CM

## 2018-09-05 DIAGNOSIS — Z91.89 HIGH RISK FOR HIP FRACTURE: ICD-10-CM

## 2018-09-05 DIAGNOSIS — F17.200 TOBACCO DEPENDENCE: ICD-10-CM

## 2018-09-05 RX ORDER — ALENDRONATE SODIUM 70 MG/1
70 TABLET ORAL
Qty: 12 TAB | Refills: 3 | Status: SHIPPED | OUTPATIENT
Start: 2018-09-05 | End: 2020-06-25

## 2018-09-05 ASSESSMENT — ACTIVITIES OF DAILY LIVING (ADL): BATHING_REQUIRES_ASSISTANCE: 0

## 2018-09-05 ASSESSMENT — PAIN SCALES - GENERAL: PAINLEVEL: NO PAIN

## 2018-09-05 ASSESSMENT — PATIENT HEALTH QUESTIONNAIRE - PHQ9: CLINICAL INTERPRETATION OF PHQ2 SCORE: 0

## 2018-09-05 NOTE — PROGRESS NOTES
HPI:  Yuly is a 68 y.o. here for Medicare Annual Wellness Visit . Patient has siblings and their children close by. 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      Patient Active Problem List    Diagnosis Date Noted   • High risk for hip fracture 09/05/2018   • Urticaria 02/10/2017   • Chronic cervical radiculopathy 11/16/2016   • Gastroesophageal reflux disease without esophagitis 11/16/2016   • Insomnia 05/10/2016   • Tobacco dependence 05/10/2016       Current Outpatient Prescriptions   Medication Sig Dispense Refill   • alendronate (FOSAMAX) 70 MG Tab Take 1 Tab by mouth every 7 days. 12 Tab 3   • omeprazole (PRILOSEC) 20 MG delayed-release capsule TAKE 1 CAP BY MOUTH EVERY DAY. 90 Cap 0   • aspirin 81 MG tablet Take 81 mg by mouth every day.     • Omega-3 Fatty Acids (FISH OIL) 1000 MG Cap capsule Take 1,000 mg by mouth every day.     • diphenhydrAMINE (BENADRYL) 25 MG Tab Take 25 mg by mouth every 6 hours as needed for Sleep.     • FLUZONE HIGH-DOSE 0.5 ML Suspension Prefilled Syringe injection TO BE ADMINISTERED BY PHARMACIST FOR IMMUNIZATION  0     No current facility-administered medications for this visit.             Current supplements as per medication list.       Allergies: Patient has no known allergies.    Immunizations:    Current social contact/activities: busy with family, sisters. Cooking for the family. 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  She has a 10.00 pack-year smoking history. She has never used smokeless tobacco. She reports that she drinks about 1.2 oz of alcohol per week . She reports that she does not use drugs.  Ready to quit: Not Answered  Counseling given: Not Answered        DPA/Advanced directive: Patient does not have an advanced directive.If no advanced directive exists, a packet and workshop information was given on advanced directives.     ROS:    Gait: Uses no  assistive device   Ostomy: no   Other tubes: no   Amputations: no   Chronic oxygen use no   Last eye exam 3 months ago   : Denies incontinence.       Screening:    Depression Screening    Little interest or pleasure in doing things?  0 - not at all  Feeling down, depressed , or hopeless? 0 - not at all  Patient Health Questionnaire Score: 0     If depressive symptoms identified deferred to follow up visit unless specifically addressed in assessment and plan.    Interpretation of PHQ-9 Total Score   Score Severity   1-4 No Depression   5-9 Mild Depression   10-14 Moderate Depression   15-19 Moderately Severe Depression   20-27 Severe Depression    Screening for Cognitive Impairment    Three Minute Recall (leader, season, table)  /3    Gurpreet clock face with all 12 numbers and set the hands to show 10 past 11.  Yes    Cognitive concerns identified deferred for follow up unless specifically addressed in assessment and plan.    Fall Risk Assessment    Has the patient had two or more falls in the last year or any fall with injury in the last year?  No    Safety Assessment    Throw rugs on floor.  No  Handrails on all stairs.  No  Good lighting in all hallways.  Yes  Difficulty hearing.  No  Patient counseled about all safety risks that were identified.    Functional Assessment ADLs    Are there any barriers preventing you from cooking for yourself or meeting nutritional needs?  No.    Are there any barriers preventing you from driving safely or obtaining transportation?  No.    Are there any barriers preventing you from using a telephone or calling for help?  No.    Are there any barriers preventing you from shopping?  No.    Are there any barriers preventing you from taking care of your own finances?  No.    Are there any barriers preventing you from managing your medications?  No.    Are there any barriers preventing you from showering, bathing or dressing yourself?  No.    Are you currently engaging in any exercise or  "physical activity?  No.     What is your perception of your health?   .      Health Maintenance Summary                Annual Wellness Visit Overdue 1950     IMM DTaP/Tdap/Td Vaccine Overdue 8/23/1967     IMM ZOSTER VACCINES Overdue 8/23/1998     IMM INFLUENZA Overdue 9/1/2018      Done 10/17/2017 Imm Admin: Influenza Vaccine Adult HD     Patient has more history with this topic...    MAMMOGRAM Next Due 1/8/2019      Done 1/8/2018 MA-MAMMO SCREENING BILAT W/TOMOSYNTHESIS W/CAD     Patient has more history with this topic...    BONE DENSITY Next Due 1/8/2023      Done 1/8/2018 DS-BONE DENSITY STUDY (DEXA)    COLONOSCOPY Next Due 11/1/2023      Done 11/1/2013 in [place] per pt          Patient Care Team:  Génesis Brown M.D. as PCP - General (Internal Medicine)        Social History   Substance Use Topics   • Smoking status: Current Every Day Smoker     Packs/day: 0.25     Years: 40.00     Types: Cigarettes   • Smokeless tobacco: Never Used   • Alcohol use 1.2 oz/week     2 Glasses of wine per week     Family History   Problem Relation Age of Onset   • Cancer Sister      She  has a past medical history of Cervical radiculopathy; Foot pain, left; High risk sexual behavior; Insomnia, psychophysiological; Menopause; Tobacco dependence; and UTI (urinary tract infection).   History reviewed. No pertinent surgical history.    Exam:     Blood pressure 140/70, pulse 77, temperature 36.8 °C (98.3 °F), resp. rate 20, height 1.53 m (5' 0.24\"), weight 51.7 kg (114 lb), SpO2 99 %, not currently breastfeeding. Body mass index is 22.09 kg/m².    Hearing good.    Dentition fair  Alert, oriented in no acute distress.  Eye contact is good, speech goal directed, affect calm    Bone density scan reviewed-high risk for hip fracture secondary to fractures score greater than 3%.    Assessment and Plan. The following treatment and monitoring plan is recommended:  patient started on Fosamax once a week and she does not have any dental " problems. Discussed about smoking cessation and the need to quit smoking secondary to family history of lung cancer. Also discussed in length about risk for cardiac as well as cerebrovascular issues while walking.  1. Encounter for Medicare annual wellness exam     2. High risk for hip fracture  alendronate (FOSAMAX) 70 MG Tab    COMP METABOLIC PANEL   3. Gastroesophageal reflux disease without esophagitis     4. Tobacco dependence  CBC WITH DIFFERENTIAL    URINALYSIS,CULTURE IF INDICATED    CANCELED: URINALYSIS         Services needed: No services needed at this time  Health Care Screening recommendations as per orders if indicated.  Referrals offered: PT/OT/Nutrition counseling/Behavioral Health/Smoking cessation as per orders if indicated.    Discussion today about general wellness and lifestyle habits:    · Prevent falls and reduce trip hazards; Cautioned about securing or removing rugs.  · Have a working fire alarm and carbon monoxide detector;   · Engage in regular physical activity and social activities       Follow-up: Return in about 6 months (around 3/5/2019).

## 2018-09-05 NOTE — PATIENT INSTRUCTIONS
Tobacco Use Disorder  Tobacco use disorder (TUD) is a mental disorder. It is the long-term use of tobacco in spite of related health problems or difficulty with normal life activities. Tobacco is most commonly smoked as cigarettes and less commonly as cigars or pipes. Smokeless chewing tobacco and snuff are also popular. People with TUD get a feeling of extreme pleasure (euphoria) from using tobacco and have a desire to use it again and again. Repeated use of tobacco can cause problems.  The addictive effects of tobacco are due mainly to the ingredient nicotine. Nicotine also causes a rush of adrenaline (epinephrine) in the body. This leads to increased blood pressure, heart rate, and breathing rate. These changes may cause problems for people with high blood pressure, weak hearts, or lung disease. High doses of nicotine in children and pets can lead to seizures and death.  Tobacco contains a number of other unsafe chemicals. These chemicals are especially harmful when inhaled as smoke and can damage almost every organ in the body. Smokers live shorter lives than nonsmokers and are at risk of dying from a number of diseases and cancers. Tobacco smoke can also cause health problems for nonsmokers (due to inhaling secondhand smoke). Smoking is also a fire hazard.  TUD usually starts in the late teenage years and is most common in young adults between the ages of 18 and 25 years. People who start smoking earlier in life are more likely to continue smoking as adults. TUD is somewhat more common in men than women. People with TUD are at higher risk for using alcohol and other drugs of abuse.  What increases the risk?  Risk factors for TUD include:  · Having family members with the disorder.  · Being around people who use tobacco.  · Having an existing mental health issue such as schizophrenia, depression, bipolar disorder, ADHD, or posttraumatic stress disorder (PTSD).  What are the signs or symptoms?  People with  tobacco use disorder have two or more of the following signs and symptoms within 12 months:  · Use of more tobacco over a longer period than intended.  · Not able to cut down or control tobacco use.  · A lot of time spent obtaining or using tobacco.  · Strong desire or urge to use tobacco (craving). Cravings may last for 6 months or longer after quitting.  · Use of tobacco even when use leads to major problems at work, school, or home.  · Use of tobacco even when use leads to relationship problems.  · Giving up or cutting down on important life activities because of tobacco use.  · Repeatedly using tobacco in situations where it puts you or others in physical danger, like smoking in bed.  · Use of tobacco even when it is known that a physical or mental problem is likely related to tobacco use.  ¨ Physical problems are numerous and may include chronic bronchitis, emphysema, lung and other cancers, gum disease, high blood pressure, heart disease, and stroke.  ¨ Mental problems caused by tobacco may include difficulty sleeping and anxiety.  · Need to use greater amounts of tobacco to get the same effect. This means you have developed a tolerance.  · Withdrawal symptoms as a result of stopping or rapidly cutting back use. These symptoms may last a month or more after quitting and include the following:  ¨ Depressed, anxious, or irritable mood.  ¨ Difficulty concentrating.  ¨ Increased appetite.  ¨ Restlessness or trouble sleeping.  ¨ Use of tobacco to avoid withdrawal symptoms.  How is this diagnosed?  Tobacco use disorder is diagnosed by your health care provider. A diagnosis may be made by:  · Your health care provider asking questions about your tobacco use and any problems it may be causing.  · A physical exam.  · Lab tests.  · You may be referred to a mental health professional or addiction specialist.  The severity of tobacco use disorder depends on the number of signs and symptoms you have:  · Mild--Two or three  symptoms.  · Moderate--Four or five symptoms.  · Severe--Six or more symptoms.  How is this treated?  Many people with tobacco use disorder are unable to quit on their own and need help. Treatment options include the following:  · Nicotine replacement therapy (NRT). NRT provides nicotine without the other harmful chemicals in tobacco. NRT gradually lowers the dosage of nicotine in the body and reduces withdrawal symptoms. NRT is available in over-the-counter forms (gum, lozenges, and skin patches) as well as prescription forms (mouth inhaler and nasal spray).  · Medicines. This may include:  ¨ Antidepressant medicine that may reduce nicotine cravings.  ¨ A medicine that acts on nicotine receptors in the brain to reduce cravings and withdrawal symptoms. It may also block the effects of tobacco in people with TUD who relapse.  · Counseling or talk therapy. A form of talk therapy called behavioral therapy is commonly used to treat people with TUD. Behavioral therapy looks at triggers for tobacco use, how to avoid them, and how to cope with cravings. It is most effective in person or by phone but is also available in self-help forms (books and Internet websites).  · Support groups. These provide emotional support, advice, and guidance for quitting tobacco.  The most effective treatment for TUD is usually a combination of medicine, talk therapy, and support groups.  Follow these instructions at home:  · Keep all follow-up visits as directed by your health care provider. This is important.  · Take medicines only as directed by your health care provider.  · Check with your health care provider before starting new prescription or over-the-counter medicines.  Contact a health care provider if:  · You are not able to take your medicines as prescribed.  · Treatment is not helping your TUD and your symptoms get worse.  Get help right away if:  · You have serious thoughts about hurting yourself or others.  · You have trouble  breathing, chest pain, sudden weakness, or sudden numbness in part of your body.  This information is not intended to replace advice given to you by your health care provider. Make sure you discuss any questions you have with your health care provider.  Document Released: 08/23/2005 Document Revised: 08/20/2017 Document Reviewed: 02/13/2015  [employer] Interactive Patient Education © 2017 [employer]

## 2018-11-18 DIAGNOSIS — K21.9 GASTROESOPHAGEAL REFLUX DISEASE WITHOUT ESOPHAGITIS: ICD-10-CM

## 2018-11-19 NOTE — TELEPHONE ENCOUNTER
Was the patient seen in the last year in this department? Yes   Last seen: 09/05/18 by Dr. Brown  Next appt: 03/06/19 with Dr. Brown      Does patient have an active prescription for medications requested? No     Received Request Via: Pharmacy

## 2018-11-20 RX ORDER — OMEPRAZOLE 20 MG/1
CAPSULE, DELAYED RELEASE ORAL
Qty: 90 CAP | Refills: 1 | Status: SHIPPED | OUTPATIENT
Start: 2018-11-20 | End: 2018-12-24

## 2018-12-24 ENCOUNTER — OFFICE VISIT (OUTPATIENT)
Dept: INTERNAL MEDICINE | Facility: MEDICAL CENTER | Age: 68
End: 2018-12-24
Payer: MEDICARE

## 2018-12-24 VITALS
OXYGEN SATURATION: 96 % | SYSTOLIC BLOOD PRESSURE: 132 MMHG | WEIGHT: 113 LBS | TEMPERATURE: 96.8 F | DIASTOLIC BLOOD PRESSURE: 66 MMHG | HEIGHT: 60 IN | HEART RATE: 80 BPM | BODY MASS INDEX: 22.19 KG/M2

## 2018-12-24 DIAGNOSIS — N30.00 ACUTE CYSTITIS WITHOUT HEMATURIA: ICD-10-CM

## 2018-12-24 DIAGNOSIS — F17.200 TOBACCO DEPENDENCE: ICD-10-CM

## 2018-12-24 DIAGNOSIS — E55.9 VITAMIN D DEFICIENCY: ICD-10-CM

## 2018-12-24 LAB
APPEARANCE UR: CLEAR
BILIRUB UR STRIP-MCNC: NEGATIVE MG/DL
COLOR UR AUTO: YELLOW
GLUCOSE UR STRIP.AUTO-MCNC: NEGATIVE MG/DL
KETONES UR STRIP.AUTO-MCNC: NEGATIVE MG/DL
LEUKOCYTE ESTERASE UR QL STRIP.AUTO: NORMAL
NITRITE UR QL STRIP.AUTO: NEGATIVE
PH UR STRIP.AUTO: 7 [PH] (ref 5–8)
PROT UR QL STRIP: NEGATIVE MG/DL
RBC UR QL AUTO: NORMAL
SP GR UR STRIP.AUTO: 1.02
UROBILINOGEN UR STRIP-MCNC: 0.2 MG/DL

## 2018-12-24 PROCEDURE — 99214 OFFICE O/P EST MOD 30 MIN: CPT | Mod: GC | Performed by: INTERNAL MEDICINE

## 2018-12-24 PROCEDURE — 81002 URINALYSIS NONAUTO W/O SCOPE: CPT | Mod: GC | Performed by: INTERNAL MEDICINE

## 2018-12-24 RX ORDER — ERGOCALCIFEROL 1.25 MG/1
50000 CAPSULE ORAL
Qty: 12 CAP | Refills: 0 | Status: SHIPPED | OUTPATIENT
Start: 2018-12-24 | End: 2019-03-15 | Stop reason: SDUPTHER

## 2018-12-24 RX ORDER — NITROFURANTOIN 25; 75 MG/1; MG/1
100 CAPSULE ORAL 2 TIMES DAILY
Qty: 10 CAP | Refills: 0 | Status: SHIPPED | OUTPATIENT
Start: 2018-12-24 | End: 2020-03-24

## 2018-12-24 NOTE — PATIENT INSTRUCTIONS
Interstitial Cystitis  Introduction  Interstitial cystitis is a condition that causes inflammation of the bladder. The bladder is a hollow organ in the lower part of your abdomen. It stores urine after the urine is made by your kidneys. With interstitial cystitis, you may have pain in the bladder area. You may also have a frequent and urgent need to urinate.  The severity of interstitial cystitis can vary from person to person. You may have flare-ups of the condition, and then it may go away for a while. For many people who have this condition, it becomes a long-term problem.  What are the causes?  The cause of this condition is not known.  What increases the risk?  This condition is more likely to develop in women.  What are the signs or symptoms?  Symptoms of interstitial cystitis vary, and they can change over time. Symptoms may include:  · Discomfort or pain in the bladder area. This can range from mild to severe. The pain may change in intensity as the bladder fills with urine or as it empties.  · Pelvic pain.  · An urgent need to urinate.  · Frequent urination.  · Pain during sexual intercourse.  · Pinpoint bleeding on the bladder wall.  For women, the symptoms often get worse during menstruation.  How is this diagnosed?  This condition is diagnosed by evaluating your symptoms and ruling out other causes. A physical exam will be done. Various tests may be done to rule out other conditions. Common tests include:  · Urine tests.  · Cystoscopy. In this test, a tool that is like a very thin telescope is used to look into your bladder.  · Biopsy. This involves taking a sample of tissue from the bladder wall to be examined under a microscope.  How is this treated?  There is no cure for interstitial cystitis, but treatment methods are available to control your symptoms. Work closely with your health care provider to find the treatments that will be most effective for you. Treatment options may include:  · Medicines  to relieve pain and to help reduce the number of times that you feel the need to urinate.  · Bladder training. This involves learning ways to control when you urinate, such as:  ¨ Urinating at scheduled times.  ¨ Training yourself to delay urination.  ¨ Doing exercises (Kegel exercises) to strengthen the muscles that control urine flow.  · Lifestyle changes, such as changing your diet or taking steps to control stress.  · Use of a device that provides electrical stimulation in order to reduce pain.  · A procedure that stretches your bladder by filling it with air or fluid.  · Surgery. This is rare. It is only done for extreme cases if other treatments do not help.  Follow these instructions at home:  · Take medicines only as directed by your health care provider.  · Use bladder training techniques as directed.  ¨ Keep a bladder diary to find out which foods, liquids, or activities make your symptoms worse.  ¨ Use your bladder diary to schedule bathroom trips. If you are away from home, plan to be near a bathroom at each of your scheduled times.  ¨ Make sure you urinate just before you leave the house and just before you go to bed.  · Do Kegel exercises as directed by your health care provider.  · Do not drink alcohol.  · Do not use any tobacco products, including cigarettes, chewing tobacco, or electronic cigarettes. If you need help quitting, ask your health care provider.  · Make dietary changes as directed by your health care provider. You may need to avoid spicy foods and foods that contain a high amount of potassium.  · Limit your drinking of beverages that stimulate urination. These include soda, coffee, and tea.  · Keep all follow-up visits as directed by your health care provider. This is important.  Contact a health care provider if:  · Your symptoms do not get better after treatment.  · Your pain and discomfort are getting worse.  · You have more frequent urges to urinate.  · You have a fever.  Get help  right away if:  · You are not able to control your bladder at all.  This information is not intended to replace advice given to you by your health care provider. Make sure you discuss any questions you have with your health care provider.  Document Released: 08/18/2005 Document Revised: 05/25/2017 Document Reviewed: 08/25/2015  © 2017 [employer]

## 2018-12-24 NOTE — PROGRESS NOTES
Established Patient    Yuly presents today with the following:    CC:   -Dysuria, urgency and frequency for 10 days  -Follow-up on chronic medical condition    HPI: Mrs. Gotti is a 68 years old lady who presents to the clinic for the previously mentioned recent.    Past medical history significant for GERD (was on omeprazole 20 mg daily, currently not taking any medications, denies any acid reflux/epigastric pain), vitamin D deficiency (currently not taking any medication).    Dysuria, urgency and frequency for 10 days:  Patient reports that she has intermittent dysuria, urgency, frequency over the last 10 days.  She reports that the symptoms is getting worse gradually.  She reports previous history of UTI 2 years ago which was treated with antibiotics.   Patient reports suprapubic discomfort.  Patient reports that she is sexually inactive,  Denies previous history of STD/HIV,  Reports mild intermittent vaginal dryness over the last year.  Denies fever, chills, vaginal discharge, vaginal itching.    Patient Active Problem List    Diagnosis Date Noted   • High risk for hip fracture 09/05/2018   • Urticaria 02/10/2017   • Chronic cervical radiculopathy 11/16/2016   • Gastroesophageal reflux disease without esophagitis 11/16/2016   • Insomnia 05/10/2016   • Tobacco dependence 05/10/2016       Current Outpatient Prescriptions   Medication Sig Dispense Refill   • vitamin D, Ergocalciferol, (DRISDOL) 26862 units Cap capsule Take 1 Cap by mouth every 7 days. 12 Cap 0   • nitrofurantoin monohydr macro (MACROBID) 100 MG Cap Take 1 Cap by mouth 2 times a day. 10 Cap 0   • alendronate (FOSAMAX) 70 MG Tab Take 1 Tab by mouth every 7 days. 12 Tab 3   • aspirin 81 MG tablet Take 81 mg by mouth every day.       No current facility-administered medications for this visit.        ROS: As per HPI. Additional pertinent systems as noted below.  Constitutional: Negative for chills and fever.   HENT: Negative for ear pain and  "sore throat.    Eyes: Negative for discharge and redness.   Respiratory: Negative for cough, hemoptysis, wheezing and stridor.    Cardiovascular: Negative for chest pain, palpitations and leg swelling.   Gastrointestinal: Negative for abdominal pain, constipation, diarrhea, heartburn, nausea and vomiting. Positive for suprapubic discomfort  Genitourinary: Positive for dysuria, frequency, urgency.  Negative for flank pain and hematuria.   Musculoskeletal: Negative for falls and myalgias.   Skin: Negative for itching and rash.   Neurological: Negative for dizziness, seizures, loss of consciousness and headaches.   Endo/Heme/Allergies: Negative for polydipsia. Does not bruise/bleed easily.   Psychiatric/Behavioral: Negative for substance abuse and suicidal ideas.       /66 (BP Location: Left arm, Patient Position: Sitting, BP Cuff Size: Adult)   Pulse 80   Temp 36 °C (96.8 °F) (Temporal)   Ht 1.53 m (5' 0.24\")   Wt 51.3 kg (113 lb)   SpO2 96%   BMI 21.89 kg/m²     Physical Exam   Constitutional:  oriented to person, place, and time.  In mild distress on palpation over the suprapubic area.  Eyes: Pupils are equal, round, and reactive to light. No scleral icterus.  Neck: Neck supple. No thyromegaly present.   Cardiovascular: Normal rate, regular rhythm and normal heart sounds.  Exam reveals no gallop and no friction rub.  No murmur heard.  Pulmonary/Chest: Breath sounds normal. Chest wall is not dull to percussion.   Musculoskeletal:   no edema.   Abdomen: Soft, nontender, negative Deleon sign, negative for organomegaly, positive for mild discomfort over the palpation of the suprapubic area, negative for inguinal lymphadenopathy.  Lymphadenopathy: no cervical adenopathy  Neurological: alert and oriented to person, place, and time.   Skin: No cyanosis. Nails show no clubbing.  Other:     Note: I have reviewed all pertinent labs and diagnostic tests associated with this visit with specific comments listed under " the assessment and plan below    Assessment and Plan    1. Acute cystitis without hematuria  -Positive for dysuria, frequency, urgency, suprapubic discomfort over the last 10 days  -Patient reports that she is noted drinking enough fluids and she is walking 4-5 miles per day  -Physical exam is positive for mild suprapubic tenderness on deep palpation  -POC urinalysis is positive for leukocyte esterase and trace hematuria  Plan  -Prescribed nitrofurantoin 100 mg twice daily for 5 days  -Educate the patient about keeping herself well-hydrated to prevent any future of UTI  -Educated the patient about UTI/cystitis  -We advised the patient to call the office or go to the emergency department if her condition getting worse or did not improve  -Patient will follow up with her PCP Dr. Brown on March 2019.    2. Vitamin D deficiency  -Patient has a vitamin D deficiency as her last vitamin D level is a 23  Results for HAROON JOY (MRN [id number]) as of 12/24/2018 15:30   Ref. Range 8/31/2018 09:01   25-Hydroxy   Vitamin D 25 Latest Ref Range: 30 - 100 ng/mL 23 (L)     -Patient is currently physically active as she is walking 4-5 miles per day  Plan  -Prescribed ergocalciferol vitamin D 50,000 units, once per week, for 3 months, advised the patient to follow-up with her PCP after that to continue on vitamin D 2000 units/day if needed  -Encouraged the patient to continue on aerobic physical exercise and include more fibers in diet    3. Tobacco dependence  -Patient reports that she is still smoking 10-15 cigarettes/day  -Patient reports unsuccessful previous attempt for quit smoking  Plan  -Counseled patient on quit smoking.  -Offer help quit smoking (nicotine patch, nicotine gum, referral to another facility to help the patient quit smoking), patient denies of for now as she is willing to quit smoking by herself, she will consider or of if she failed to quit smoking again        Followup: Return if symptoms worsen or fail to  improve, for Please advice the patient to follow up with her PCP Dr. Brown.      Signed by: Valery Oconnell M.D.

## 2019-03-06 ENCOUNTER — APPOINTMENT (OUTPATIENT)
Dept: INTERNAL MEDICINE | Facility: MEDICAL CENTER | Age: 69
End: 2019-03-06
Payer: MEDICARE

## 2019-03-15 DIAGNOSIS — E55.9 VITAMIN D DEFICIENCY: ICD-10-CM

## 2019-03-15 RX ORDER — ERGOCALCIFEROL 1.25 MG/1
CAPSULE ORAL
Qty: 12 CAP | Refills: 0 | Status: SHIPPED | OUTPATIENT
Start: 2019-03-15 | End: 2020-06-25

## 2019-03-15 NOTE — TELEPHONE ENCOUNTER
Last seen: 12/24/18 by Dr. Santos  Next appt: None    Was the patient seen in the last year in this department? Yes   Does patient have an active prescription for medications requested? No   Received Request Via: Pharmacy

## 2019-04-03 ENCOUNTER — HOSPITAL ENCOUNTER (OUTPATIENT)
Facility: MEDICAL CENTER | Age: 69
End: 2019-04-04
Attending: EMERGENCY MEDICINE | Admitting: SURGERY
Payer: MEDICARE

## 2019-04-03 ENCOUNTER — ANESTHESIA EVENT (OUTPATIENT)
Dept: SURGERY | Facility: MEDICAL CENTER | Age: 69
End: 2019-04-03
Payer: MEDICARE

## 2019-04-03 ENCOUNTER — ANESTHESIA (OUTPATIENT)
Dept: SURGERY | Facility: MEDICAL CENTER | Age: 69
End: 2019-04-03
Payer: MEDICARE

## 2019-04-03 DIAGNOSIS — S55.112A LACERATION OF LEFT RADIAL ARTERY, INITIAL ENCOUNTER: ICD-10-CM

## 2019-04-03 DIAGNOSIS — S55.102D: ICD-10-CM

## 2019-04-03 PROBLEM — S55.102A INJURY OF LEFT RADIAL ARTERY: Status: ACTIVE | Noted: 2019-04-03

## 2019-04-03 LAB
ABO GROUP BLD: NORMAL
ABO GROUP BLD: NORMAL
APTT PPP: 30.4 SEC (ref 24.7–36)
APTT PPP: 33.7 SEC (ref 24.7–36)
BASOPHILS # BLD AUTO: 0.5 % (ref 0–1.8)
BASOPHILS # BLD: 0.05 K/UL (ref 0–0.12)
BLD GP AB SCN SERPL QL: NORMAL
EOSINOPHIL # BLD AUTO: 0.14 K/UL (ref 0–0.51)
EOSINOPHIL NFR BLD: 1.5 % (ref 0–6.9)
ERYTHROCYTE [DISTWIDTH] IN BLOOD BY AUTOMATED COUNT: 46.1 FL (ref 35.9–50)
ERYTHROCYTE [DISTWIDTH] IN BLOOD BY AUTOMATED COUNT: 46.2 FL (ref 35.9–50)
GLUCOSE BLD-MCNC: 151 MG/DL (ref 65–99)
HCT VFR BLD AUTO: 37 % (ref 37–47)
HCT VFR BLD AUTO: 39.8 % (ref 37–47)
HGB BLD-MCNC: 12.3 G/DL (ref 12–16)
HGB BLD-MCNC: 13.6 G/DL (ref 12–16)
IMM GRANULOCYTES # BLD AUTO: 0.02 K/UL (ref 0–0.11)
IMM GRANULOCYTES NFR BLD AUTO: 0.2 % (ref 0–0.9)
INR PPP: 1.04 (ref 0.87–1.13)
INR PPP: 1.05 (ref 0.87–1.13)
LYMPHOCYTES # BLD AUTO: 3.47 K/UL (ref 1–4.8)
LYMPHOCYTES NFR BLD: 36.7 % (ref 22–41)
MCH RBC QN AUTO: 31.1 PG (ref 27–33)
MCH RBC QN AUTO: 31.7 PG (ref 27–33)
MCHC RBC AUTO-ENTMCNC: 33.2 G/DL (ref 33.6–35)
MCHC RBC AUTO-ENTMCNC: 34.2 G/DL (ref 33.6–35)
MCV RBC AUTO: 92.8 FL (ref 81.4–97.8)
MCV RBC AUTO: 93.7 FL (ref 81.4–97.8)
MONOCYTES # BLD AUTO: 0.73 K/UL (ref 0–0.85)
MONOCYTES NFR BLD AUTO: 7.7 % (ref 0–13.4)
NEUTROPHILS # BLD AUTO: 5.05 K/UL (ref 2–7.15)
NEUTROPHILS NFR BLD: 53.4 % (ref 44–72)
NRBC # BLD AUTO: 0 K/UL
NRBC BLD-RTO: 0 /100 WBC
PLATELET # BLD AUTO: 244 K/UL (ref 164–446)
PLATELET # BLD AUTO: 285 K/UL (ref 164–446)
PMV BLD AUTO: 10.7 FL (ref 9–12.9)
PMV BLD AUTO: 10.8 FL (ref 9–12.9)
PROTHROMBIN TIME: 13.7 SEC (ref 12–14.6)
PROTHROMBIN TIME: 13.8 SEC (ref 12–14.6)
RBC # BLD AUTO: 3.95 M/UL (ref 4.2–5.4)
RBC # BLD AUTO: 4.29 M/UL (ref 4.2–5.4)
RH BLD: NORMAL
RH BLD: NORMAL
WBC # BLD AUTO: 9.4 K/UL (ref 4.8–10.8)
WBC # BLD AUTO: 9.5 K/UL (ref 4.8–10.8)

## 2019-04-03 PROCEDURE — 99291 CRITICAL CARE FIRST HOUR: CPT

## 2019-04-03 PROCEDURE — C1757 CATH, THROMBECTOMY/EMBOLECT: HCPCS | Performed by: SURGERY

## 2019-04-03 PROCEDURE — A6402 STERILE GAUZE <= 16 SQ IN: HCPCS | Performed by: SURGERY

## 2019-04-03 PROCEDURE — 160036 HCHG PACU - EA ADDL 30 MINS PHASE I: Performed by: SURGERY

## 2019-04-03 PROCEDURE — 160002 HCHG RECOVERY MINUTES (STAT): Performed by: SURGERY

## 2019-04-03 PROCEDURE — 160035 HCHG PACU - 1ST 60 MINS PHASE I: Performed by: SURGERY

## 2019-04-03 PROCEDURE — 85027 COMPLETE CBC AUTOMATED: CPT

## 2019-04-03 PROCEDURE — 160028 HCHG SURGERY MINUTES - 1ST 30 MINS LEVEL 3: Performed by: SURGERY

## 2019-04-03 PROCEDURE — 85730 THROMBOPLASTIN TIME PARTIAL: CPT

## 2019-04-03 PROCEDURE — 700111 HCHG RX REV CODE 636 W/ 250 OVERRIDE (IP)

## 2019-04-03 PROCEDURE — 86850 RBC ANTIBODY SCREEN: CPT

## 2019-04-03 PROCEDURE — 36415 COLL VENOUS BLD VENIPUNCTURE: CPT

## 2019-04-03 PROCEDURE — 501838 HCHG SUTURE GENERAL: Performed by: SURGERY

## 2019-04-03 PROCEDURE — 700111 HCHG RX REV CODE 636 W/ 250 OVERRIDE (IP): Performed by: SURGERY

## 2019-04-03 PROCEDURE — 500002 HCHG ADHESIVE, DERMABOND: Performed by: SURGERY

## 2019-04-03 PROCEDURE — 700111 HCHG RX REV CODE 636 W/ 250 OVERRIDE (IP): Performed by: ANESTHESIOLOGY

## 2019-04-03 PROCEDURE — 85025 COMPLETE CBC W/AUTO DIFF WBC: CPT

## 2019-04-03 PROCEDURE — G0378 HOSPITAL OBSERVATION PER HR: HCPCS

## 2019-04-03 PROCEDURE — 700102 HCHG RX REV CODE 250 W/ 637 OVERRIDE(OP): Performed by: SURGERY

## 2019-04-03 PROCEDURE — 85610 PROTHROMBIN TIME: CPT

## 2019-04-03 PROCEDURE — 86900 BLOOD TYPING SEROLOGIC ABO: CPT

## 2019-04-03 PROCEDURE — 82962 GLUCOSE BLOOD TEST: CPT

## 2019-04-03 PROCEDURE — 160039 HCHG SURGERY MINUTES - EA ADDL 1 MIN LEVEL 3: Performed by: SURGERY

## 2019-04-03 PROCEDURE — 700105 HCHG RX REV CODE 258: Performed by: ANESTHESIOLOGY

## 2019-04-03 PROCEDURE — 160048 HCHG OR STATISTICAL LEVEL 1-5: Performed by: SURGERY

## 2019-04-03 PROCEDURE — 86901 BLOOD TYPING SEROLOGIC RH(D): CPT

## 2019-04-03 PROCEDURE — 160009 HCHG ANES TIME/MIN: Performed by: SURGERY

## 2019-04-03 PROCEDURE — A9270 NON-COVERED ITEM OR SERVICE: HCPCS | Performed by: SURGERY

## 2019-04-03 PROCEDURE — 96365 THER/PROPH/DIAG IV INF INIT: CPT | Mod: XU

## 2019-04-03 PROCEDURE — 700101 HCHG RX REV CODE 250: Performed by: ANESTHESIOLOGY

## 2019-04-03 PROCEDURE — 501837 HCHG SUTURE CV: Performed by: SURGERY

## 2019-04-03 PROCEDURE — 700105 HCHG RX REV CODE 258

## 2019-04-03 RX ORDER — ONDANSETRON 2 MG/ML
4 INJECTION INTRAMUSCULAR; INTRAVENOUS EVERY 4 HOURS PRN
Status: DISCONTINUED | OUTPATIENT
Start: 2019-04-03 | End: 2019-04-04 | Stop reason: HOSPADM

## 2019-04-03 RX ORDER — DEXAMETHASONE SODIUM PHOSPHATE 4 MG/ML
4 INJECTION, SOLUTION INTRA-ARTICULAR; INTRALESIONAL; INTRAMUSCULAR; INTRAVENOUS; SOFT TISSUE
Status: DISCONTINUED | OUTPATIENT
Start: 2019-04-03 | End: 2019-04-04 | Stop reason: HOSPADM

## 2019-04-03 RX ORDER — BUPIVACAINE HYDROCHLORIDE 2.5 MG/ML
INJECTION, SOLUTION EPIDURAL; INFILTRATION; INTRACAUDAL
Status: DISCONTINUED | OUTPATIENT
Start: 2019-04-03 | End: 2019-04-03 | Stop reason: HOSPADM

## 2019-04-03 RX ORDER — ONDANSETRON 2 MG/ML
INJECTION INTRAMUSCULAR; INTRAVENOUS PRN
Status: DISCONTINUED | OUTPATIENT
Start: 2019-04-03 | End: 2019-04-03 | Stop reason: SURG

## 2019-04-03 RX ORDER — HALOPERIDOL 5 MG/ML
1 INJECTION INTRAMUSCULAR
Status: DISCONTINUED | OUTPATIENT
Start: 2019-04-03 | End: 2019-04-03 | Stop reason: HOSPADM

## 2019-04-03 RX ORDER — CEFAZOLIN SODIUM 2 G/100ML
2 INJECTION, SOLUTION INTRAVENOUS EVERY 8 HOURS
Status: DISCONTINUED | OUTPATIENT
Start: 2019-04-03 | End: 2019-04-04 | Stop reason: HOSPADM

## 2019-04-03 RX ORDER — MORPHINE SULFATE 4 MG/ML
1-2 INJECTION, SOLUTION INTRAMUSCULAR; INTRAVENOUS
Status: DISCONTINUED | OUTPATIENT
Start: 2019-04-03 | End: 2019-04-04 | Stop reason: HOSPADM

## 2019-04-03 RX ORDER — PROTAMINE SULFATE 10 MG/ML
20 INJECTION, SOLUTION INTRAVENOUS ONCE
Status: COMPLETED | OUTPATIENT
Start: 2019-04-03 | End: 2019-04-03

## 2019-04-03 RX ORDER — SODIUM CHLORIDE, SODIUM LACTATE, POTASSIUM CHLORIDE, CALCIUM CHLORIDE 600; 310; 30; 20 MG/100ML; MG/100ML; MG/100ML; MG/100ML
INJECTION, SOLUTION INTRAVENOUS CONTINUOUS
Status: DISCONTINUED | OUTPATIENT
Start: 2019-04-03 | End: 2019-04-03 | Stop reason: HOSPADM

## 2019-04-03 RX ORDER — ASPIRIN 81 MG/1
81 TABLET, CHEWABLE ORAL DAILY
Status: DISCONTINUED | OUTPATIENT
Start: 2019-04-03 | End: 2019-04-03 | Stop reason: HOSPADM

## 2019-04-03 RX ORDER — HALOPERIDOL 5 MG/ML
1 INJECTION INTRAMUSCULAR EVERY 6 HOURS PRN
Status: DISCONTINUED | OUTPATIENT
Start: 2019-04-03 | End: 2019-04-04 | Stop reason: HOSPADM

## 2019-04-03 RX ORDER — HYDRALAZINE HYDROCHLORIDE 20 MG/ML
20 INJECTION INTRAMUSCULAR; INTRAVENOUS EVERY 4 HOURS PRN
Status: DISCONTINUED | OUTPATIENT
Start: 2019-04-03 | End: 2019-04-04 | Stop reason: HOSPADM

## 2019-04-03 RX ORDER — HYDROCODONE BITARTRATE AND ACETAMINOPHEN 5; 325 MG/1; MG/1
1-2 TABLET ORAL EVERY 6 HOURS PRN
Status: DISCONTINUED | OUTPATIENT
Start: 2019-04-03 | End: 2019-04-04 | Stop reason: HOSPADM

## 2019-04-03 RX ORDER — METOCLOPRAMIDE HYDROCHLORIDE 5 MG/ML
INJECTION INTRAMUSCULAR; INTRAVENOUS PRN
Status: DISCONTINUED | OUTPATIENT
Start: 2019-04-03 | End: 2019-04-03 | Stop reason: SURG

## 2019-04-03 RX ORDER — CHLORAL HYDRATE 500 MG
1000 CAPSULE ORAL DAILY
COMMUNITY

## 2019-04-03 RX ORDER — ACETAMINOPHEN 500 MG
500 TABLET ORAL EVERY 4 HOURS PRN
Status: DISCONTINUED | OUTPATIENT
Start: 2019-04-03 | End: 2019-04-04 | Stop reason: HOSPADM

## 2019-04-03 RX ORDER — SODIUM CHLORIDE, SODIUM LACTATE, POTASSIUM CHLORIDE, CALCIUM CHLORIDE 600; 310; 30; 20 MG/100ML; MG/100ML; MG/100ML; MG/100ML
INJECTION, SOLUTION INTRAVENOUS
Status: DISCONTINUED | OUTPATIENT
Start: 2019-04-03 | End: 2019-04-03 | Stop reason: SURG

## 2019-04-03 RX ORDER — DIPHENHYDRAMINE HYDROCHLORIDE 50 MG/ML
12.5 INJECTION INTRAMUSCULAR; INTRAVENOUS
Status: DISCONTINUED | OUTPATIENT
Start: 2019-04-03 | End: 2019-04-03 | Stop reason: HOSPADM

## 2019-04-03 RX ORDER — SODIUM CHLORIDE 9 MG/ML
INJECTION, SOLUTION INTRAVENOUS
Status: COMPLETED
Start: 2019-04-03 | End: 2019-04-03

## 2019-04-03 RX ORDER — SODIUM CHLORIDE, SODIUM LACTATE, POTASSIUM CHLORIDE, CALCIUM CHLORIDE 600; 310; 30; 20 MG/100ML; MG/100ML; MG/100ML; MG/100ML
INJECTION, SOLUTION INTRAVENOUS CONTINUOUS
Status: DISCONTINUED | OUTPATIENT
Start: 2019-04-03 | End: 2019-04-04 | Stop reason: HOSPADM

## 2019-04-03 RX ORDER — SCOLOPAMINE TRANSDERMAL SYSTEM 1 MG/1
1 PATCH, EXTENDED RELEASE TRANSDERMAL
Status: DISCONTINUED | OUTPATIENT
Start: 2019-04-03 | End: 2019-04-04 | Stop reason: HOSPADM

## 2019-04-03 RX ORDER — PHENYLEPHRINE HYDROCHLORIDE 10 MG/ML
INJECTION, SOLUTION INTRAMUSCULAR; INTRAVENOUS; SUBCUTANEOUS PRN
Status: DISCONTINUED | OUTPATIENT
Start: 2019-04-03 | End: 2019-04-03 | Stop reason: SURG

## 2019-04-03 RX ORDER — ONDANSETRON 2 MG/ML
4 INJECTION INTRAMUSCULAR; INTRAVENOUS
Status: DISCONTINUED | OUTPATIENT
Start: 2019-04-03 | End: 2019-04-03 | Stop reason: HOSPADM

## 2019-04-03 RX ORDER — HEPARIN SODIUM,PORCINE 1000/ML
VIAL (ML) INJECTION
Status: DISCONTINUED | OUTPATIENT
Start: 2019-04-03 | End: 2019-04-03 | Stop reason: HOSPADM

## 2019-04-03 RX ORDER — CEFAZOLIN SODIUM 1 G/3ML
INJECTION, POWDER, FOR SOLUTION INTRAMUSCULAR; INTRAVENOUS PRN
Status: DISCONTINUED | OUTPATIENT
Start: 2019-04-03 | End: 2019-04-03 | Stop reason: SURG

## 2019-04-03 RX ORDER — SODIUM CHLORIDE, SODIUM GLUCONATE, SODIUM ACETATE, POTASSIUM CHLORIDE AND MAGNESIUM CHLORIDE 526; 502; 368; 37; 30 MG/100ML; MG/100ML; MG/100ML; MG/100ML; MG/100ML
INJECTION, SOLUTION INTRAVENOUS
Status: DISCONTINUED | OUTPATIENT
Start: 2019-04-03 | End: 2019-04-03 | Stop reason: SURG

## 2019-04-03 RX ORDER — MEPERIDINE HYDROCHLORIDE 25 MG/ML
6.25 INJECTION INTRAMUSCULAR; INTRAVENOUS; SUBCUTANEOUS
Status: DISCONTINUED | OUTPATIENT
Start: 2019-04-03 | End: 2019-04-03 | Stop reason: HOSPADM

## 2019-04-03 RX ADMIN — MIDAZOLAM HYDROCHLORIDE 2 MG: 1 INJECTION, SOLUTION INTRAMUSCULAR; INTRAVENOUS at 13:48

## 2019-04-03 RX ADMIN — METOCLOPRAMIDE 10 MG: 5 INJECTION, SOLUTION INTRAMUSCULAR; INTRAVENOUS at 14:22

## 2019-04-03 RX ADMIN — SODIUM CHLORIDE, POTASSIUM CHLORIDE, SODIUM LACTATE AND CALCIUM CHLORIDE: 600; 310; 30; 20 INJECTION, SOLUTION INTRAVENOUS at 14:22

## 2019-04-03 RX ADMIN — HYDRALAZINE HYDROCHLORIDE 20 MG: 20 INJECTION INTRAMUSCULAR; INTRAVENOUS at 18:05

## 2019-04-03 RX ADMIN — PROTAMINE SULFATE 20 MG: 10 INJECTION, SOLUTION INTRAVENOUS at 17:50

## 2019-04-03 RX ADMIN — FENTANYL CITRATE 50 MCG: 50 INJECTION, SOLUTION INTRAMUSCULAR; INTRAVENOUS at 14:23

## 2019-04-03 RX ADMIN — ONDANSETRON 4 MG: 2 INJECTION INTRAMUSCULAR; INTRAVENOUS at 15:05

## 2019-04-03 RX ADMIN — Medication 5000 UNITS: at 14:29

## 2019-04-03 RX ADMIN — CEFAZOLIN 2 G: 330 INJECTION, POWDER, FOR SOLUTION INTRAMUSCULAR; INTRAVENOUS at 14:12

## 2019-04-03 RX ADMIN — SODIUM CHLORIDE 500 ML: 9 INJECTION, SOLUTION INTRAVENOUS at 21:43

## 2019-04-03 RX ADMIN — CEFAZOLIN SODIUM 2 G: 2 INJECTION, SOLUTION INTRAVENOUS at 21:46

## 2019-04-03 RX ADMIN — ASPIRIN 81 MG 81 MG: 81 TABLET ORAL at 15:47

## 2019-04-03 RX ADMIN — ROCURONIUM BROMIDE 12 MG: 10 INJECTION, SOLUTION INTRAVENOUS at 14:22

## 2019-04-03 RX ADMIN — PHENYLEPHRINE HYDROCHLORIDE 40 MCG: 10 INJECTION INTRAVENOUS at 14:50

## 2019-04-03 RX ADMIN — SODIUM CHLORIDE, SODIUM GLUCONATE, SODIUM ACETATE, POTASSIUM CHLORIDE AND MAGNESIUM CHLORIDE: 526; 502; 368; 37; 30 INJECTION, SOLUTION INTRAVENOUS at 15:07

## 2019-04-03 RX ADMIN — HYDROCODONE BITARTRATE AND ACETAMINOPHEN 1 TABLET: 5; 325 TABLET ORAL at 23:59

## 2019-04-03 RX ADMIN — PHENYLEPHRINE HYDROCHLORIDE 40 MCG: 10 INJECTION INTRAVENOUS at 15:00

## 2019-04-03 RX ADMIN — SUCCINYLCHOLINE CHLORIDE 100 MG: 20 INJECTION, SOLUTION INTRAMUSCULAR; INTRAVENOUS at 14:22

## 2019-04-03 RX ADMIN — PROPOFOL 120 MG: 10 INJECTION, EMULSION INTRAVENOUS at 14:22

## 2019-04-03 ASSESSMENT — COGNITIVE AND FUNCTIONAL STATUS - GENERAL
MOBILITY SCORE: 24
SUGGESTED CMS G CODE MODIFIER MOBILITY: CH
DAILY ACTIVITIY SCORE: 24
SUGGESTED CMS G CODE MODIFIER DAILY ACTIVITY: CH

## 2019-04-03 ASSESSMENT — PAIN SCALES - GENERAL: PAIN_LEVEL: 1

## 2019-04-03 ASSESSMENT — PATIENT HEALTH QUESTIONNAIRE - PHQ9
SUM OF ALL RESPONSES TO PHQ9 QUESTIONS 1 AND 2: 0
2. FEELING DOWN, DEPRESSED, IRRITABLE, OR HOPELESS: NOT AT ALL
1. LITTLE INTEREST OR PLEASURE IN DOING THINGS: NOT AT ALL

## 2019-04-03 ASSESSMENT — LIFESTYLE VARIABLES
EVER_SMOKED: YES
DO YOU DRINK ALCOHOL: NO

## 2019-04-03 NOTE — OR SURGEON
Immediate Post OP Note    PreOp Diagnosis: Left wrist laceration with radial arterial and tendon injury.    PostOp Diagnosis: Same.    Procedure(s):  RADIAL ARTERY REPAIR - Wound Class: Contaminated  REPAIR, TENDON - Wound Class: Contaminated    Surgeon(s):  JORGE Bashir M.D.    Anesthesiologist/Type of Anesthesia:  Anesthesiologist: Romulo Nguyen M.D./General    Surgical Staff:  Circulator: Karen De Jesus R.N.  Relief Circulator: Jweels Victor R.N.  Scrub Person: Elda Shepard    Specimens removed if any:  None.    Estimated Blood Loss: 10ml.    IVF: 1l.    Findings: Transected radial artery, successfully repaired primarily.  Transected tendon, repaired successfully.    Complications: None.    Dictated, #[id number].        4/3/2019 3:25 PM Wally Colbert M.D.

## 2019-04-03 NOTE — ANESTHESIA QCDR
2019 [facility] Clinical Data Registry (for Quality Improvement)     Postoperative nausea/vomiting risk protocol (Adult = 18 yrs and Pediatric 3-17 yrs)- (430 and 463)  General inhalation anesthetic (NOT TIVA) with PONV risk factors: No  Provision of anti-emetic therapy with at least 2 different classes of agents: N/A  Patient DID NOT receive anti-emetic therapy and reason is documented in Medical Record: N/A    Multimodal Pain Management- (AQI59)  Patient undergoing Elective Surgery (i.e. Outpatient, or ASC, or Prescheduled Surgery prior to Hospital Admission): No  Use of Multimodal Pain Management, two or more drugs and/or interventions, NOT including systemic opioids: N/A  Exception: Documented allergy to multiple classes of analgesics: N/A    PACU assessment of acute postoperative pain prior to Anesthesia Care End- Applies to Patients Age = 18- (ABG7)  Initial PACU pain score is which of the following: < 7/10  Patient unable to report pain score: N/A    Post-anesthetic transfer of care checklist/protocol to PACU/ICU- (426 and 427)  Upon conclusion of case, patient transferred to which of the following locations: ICU  Use of transfer checklist/protocol:   Exclusion: Service Performed in Patient Hospital Room (and thus did not require transfer):     PACU Reintubation- (AQI31)  General anesthesia requiring endotracheal intubation (ETT) along with subsequent extubation in OR or PACU: Yes  Required reintubation in the PACU: No   Extubation was a planned trial documented in the medical record prior to removal of the original airway device:  N/A    Unplanned admission to ICU related to anesthesia service up through end of PACU care- (MD51)  Unplanned admission to ICU (not initially anticipated at anesthesia start time): No

## 2019-04-03 NOTE — PROGRESS NOTES
"Pt arrives to floor, with transfer to bed left surgical site starts to bleed soaking through splint; Manual pressure applied and MD paged STAT.  VSS, pt oriented; RN holding manual pressure.    Dr. Robles returned page. MD to be at bedside as soon as possible; Asking to give protamine; Unable to give protamine on CDU per pharmacist.   Pt stable;  MD states,\" I don' t know why my pt is on that floor.\"   Holding manual pressure awaiting pt at bedside.   "

## 2019-04-03 NOTE — CONSULTS
Seen and examined.   Dictated, #[id number].    To OR for left wrist exploration, possible radial arterial repair / ligation.  Dr. Saenz is available in case of tendon repair.

## 2019-04-03 NOTE — ANESTHESIA PROCEDURE NOTES
Airway  Date/Time: 4/3/2019 2:24 PM  Performed by: JACKSON ROMERO  Authorized by: JACKSON ROMERO     Location:  OR  Urgency:  Emergent  Difficult Airway: No    Verbal Consent Obtained: Yes    Consent Given By:  Patient  Patient Identity Confirmed by:  Verbally with patient and arm band  Indications for Airway Management:  Anesthesia  Spontaneous Ventilation: absent    Sedation Level:  Deep  Preoxygenated: Yes    Patient Position:  Sniffing  Final Airway Type:  Endotracheal airway  Final Endotracheal Airway:  ETT  Cuffed: Yes    Technique Used for Successful ETT Placement:  Direct laryngoscopy  Insertion Site:  Oral  Blade Type:  Lori  Laryngoscope Blade/Videolaryngoscope Blade Size:  3  ETT Size (mm):  6.5  Measured from:  Teeth  ETT to Teeth (cm):  22  Placement Verified by: auscultation and capnometry    Cormack-Lehane Classification:  Grade I - full view of glottis  Number of Attempts at Approach:  1   Rapid sequence induction/intubation

## 2019-04-03 NOTE — OR NURSING
Pt drinking water and talking with her brother; L fingers pink, warm and dry with brisk cap refill; no c/o pain; awaiting room assignment.

## 2019-04-03 NOTE — ANESTHESIA PREPROCEDURE EVALUATION
Relevant Problems   (+) Gastroesophageal reflux disease without esophagitis       Physical Exam    Airway   Mallampati: II  TM distance: >3 FB  Neck ROM: full       Cardiovascular - normal exam  Rhythm: regular  Rate: normal  (-) murmur     Dental - normal exam         Pulmonary - normal exam  Breath sounds clear to auscultation     Abdominal    Neurological - normal exam                 Anesthesia Plan    ASA 2 - emergent   ASA physical status emergent criteria: acute hemorrhage    Plan - general             Induction: intravenous          Informed Consent:  Emergent - Consent given by clinician  Anesthetic plan and risks discussed with patient.

## 2019-04-03 NOTE — ED NOTES
Pt to bathroom prior to transport. Removed all clothing and jewelry and given to family. Tourniquet removed off and on for reperfusion and direct pressure applied for transport to OR.

## 2019-04-03 NOTE — ED PROVIDER NOTES
"ED Provider Note    CHIEF COMPLAINT  Chief Complaint   Patient presents with   • Laceration     to left wrist with broken glass on accident. +active bleeding.        HPI  Yuly Gotti is a 68 y.o. female who presents For evaluation of a laceration to her left wrist that was accidentally sustained on broken glass.  The patient denies any weakness or numbness.  She states she takes a daily aspirin otherwise no blood thinners.  She adamantly denies any nonaccidental intent.  She is otherwise healthy denies medical problems.    REVIEW OF SYSTEMS  Negative for fever, rash, weakness, numbness    PAST MEDICAL HISTORY   has a past medical history of Cervical radiculopathy; Foot pain, left; High risk sexual behavior; Insomnia, psychophysiological; Menopause; Tobacco dependence; and UTI (urinary tract infection).    SOCIAL HISTORY  Social History     Social History Main Topics   • Smoking status: Current Every Day Smoker     Packs/day: 0.25     Years: 40.00     Types: Cigarettes   • Smokeless tobacco: Never Used      Comment: 3 or 4 cigs   • Alcohol use 1.2 oz/week     2 Glasses of wine per week   • Drug use: No   • Sexual activity: Not on file       SURGICAL HISTORY  patient denies any surgical history    CURRENT MEDICATIONS  I personally reviewed the medication list in the charting documentation.     ALLERGIES  No Known Allergies    PHYSICAL EXAM  VITAL SIGNS: /106   Pulse 79   Temp 36.9 °C (98.5 °F) (Temporal)   Resp 20   Ht 1.575 m (5' 2\")   Wt 49.9 kg (110 lb)   LMP 01/01/1989   SpO2 96%   BMI 20.12 kg/m²   Constitutional: Alert in no apparent distress.  HENT: No signs of trauma.   Eyes: Conjunctiva normal, Non-icteric.   Chest: Normal nonlabored respirations  Skin: No erythema, No rash.   Musculoskeletal: Inspection of left wrist reveals a deep laceration overlying the radial aspect volarly with pulsatile bleeding.  Normal sensation distally.  Neurologic: Alert, No focal deficits noted.   Psychiatric: " Affect normal, Judgment normal.    DIAGNOSTIC STUDIES / PROCEDURES    LABS/EKGs  Results for orders placed or performed during the hospital encounter of 04/03/19   COD - Adult (Type and Screen)   Result Value Ref Range    ABO Grouping Only AB     Rh Grouping Only NEG     Antibody Screen-Cod NEG    CBC WITH DIFFERENTIAL   Result Value Ref Range    WBC 9.5 4.8 - 10.8 K/uL    RBC 4.29 4.20 - 5.40 M/uL    Hemoglobin 13.6 12.0 - 16.0 g/dL    Hematocrit 39.8 37.0 - 47.0 %    MCV 92.8 81.4 - 97.8 fL    MCH 31.7 27.0 - 33.0 pg    MCHC 34.2 33.6 - 35.0 g/dL    RDW 46.2 35.9 - 50.0 fL    Platelet Count 285 164 - 446 K/uL    MPV 10.8 9.0 - 12.9 fL    Neutrophils-Polys 53.40 44.00 - 72.00 %    Lymphocytes 36.70 22.00 - 41.00 %    Monocytes 7.70 0.00 - 13.40 %    Eosinophils 1.50 0.00 - 6.90 %    Basophils 0.50 0.00 - 1.80 %    Immature Granulocytes 0.20 0.00 - 0.90 %    Nucleated RBC 0.00 /100 WBC    Neutrophils (Absolute) 5.05 2.00 - 7.15 K/uL    Lymphs (Absolute) 3.47 1.00 - 4.80 K/uL    Monos (Absolute) 0.73 0.00 - 0.85 K/uL    Eos (Absolute) 0.14 0.00 - 0.51 K/uL    Baso (Absolute) 0.05 0.00 - 0.12 K/uL    Immature Granulocytes (abs) 0.02 0.00 - 0.11 K/uL    NRBC (Absolute) 0.00 K/uL   PT/INR   Result Value Ref Range    PT 13.7 12.0 - 14.6 sec    INR 1.04 0.87 - 1.13   PTT   Result Value Ref Range    APTT 33.7 24.7 - 36.0 sec   CBC WITHOUT DIFFERENTIAL   Result Value Ref Range    WBC 9.4 4.8 - 10.8 K/uL    RBC 3.95 (L) 4.20 - 5.40 M/uL    Hemoglobin 12.3 12.0 - 16.0 g/dL    Hematocrit 37.0 37.0 - 47.0 %    MCV 93.7 81.4 - 97.8 fL    MCH 31.1 27.0 - 33.0 pg    MCHC 33.2 (L) 33.6 - 35.0 g/dL    RDW 46.1 35.9 - 50.0 fL    Platelet Count 244 164 - 446 K/uL    MPV 10.7 9.0 - 12.9 fL          COURSE & MEDICAL DECISION MAKING  Pertinent Labs & Imaging studies reviewed. (See chart for details)    Encounter Summary: This is a 68 y.o. female with an accidental laceration to the left wrist with obvious radial artery laceration, direct  pressure initially failed at hemostasis so a tourniquet was placed, the wound was infiltrated with lidocaine and epinephrine, eventually we were able to take down the tourniquet with no active pulsatile bleeding.  I spoke to Dr. Colbert, and vascular surgeon who will come evaluate the patient, also will discuss the case with a hand surgeon as there is a very high likelihood of structural damage to the wrist was a difficult initial examination given the degree of bleeding ------- Dr. Colbert brought the patient to the operating room for definitive repair.  I also discussed case with the on-call hand surgeon who said he would evaluate the patient in the operating room.      DISPOSITION: Admit to the operating room      FINAL IMPRESSION  1. Laceration of left radial artery, initial encounter        This dictation was created using voice recognition software. The accuracy of the dictation is limited to the abilities of the software. I expect there may be some errors of grammar and possibly content. The nursing notes were reviewed and certain aspects of this information were incorporated into this note.    Electronically signed by: Chai Genao, 4/3/2019 1:24 PM

## 2019-04-03 NOTE — OP REPORT
DATE OF SERVICE:  04/03/2019    SURGEON:  Wally Colbert MD    ANESTHESIOLOGIST:  Romulo Nguyen MD    TYPE OF ANESTHESIA:  General anesthesia.    PREOPERATIVE DIAGNOSIS:  Left wrist laceration with radial artery and tendon   Injuries.    POSTOPERATIVE DIAGNOSIS:  Left wrist laceration with radial artery and tendon   Transection.    PROCEDURES:  1.  Exploration of left wrist wound.  2.  Primary repair of left radial arterial injury.    INDICATION FOR PROCEDURE:  The patient is a pleasant lady who sustained a   laceration in the left wrist, which appear to involve the tendon as well as   the artery.  Discussion was made with patient and her brother.  They would   like to undergo a surgical repair, fully understanding all risks.    DESCRIPTION OF PROCEDURE:  Informed consent was obtained.  Patient was taken   to the operating room and was placed in the supine position.  She was given   Ancef intravenously.  General anesthesia was induced.  Next, her left upper   extremity was sterilely prepped and draped in the normal fashion.  A time-out   procedure was done.  Exploration of the wound was done.  The radial artery was   found to be completely transected.  Vascular control of the radial artery   proximal and distal to the transection was obtained with nontraumatic vascular   bulldog clamps.  Patient was given heparin 5000 units intravenously.    Thrombectomy of the radial artery was performed using #2 Debora   thromboembolectomy catheter.  Excellent inflow and backbleeding were seen.  The   radial artery was then repaired primarily with interrupted 7-0 Prolene sutures   end-to-end anastomosis.  Prior to completing the repair, backbleeding and   flushing were obtained.  The repair was completed.  A sterile Doppler probe was   brought into the operative field and excellent Doppler flow signals were  obtained over the radial artery, both proximal and distal to the anastomosis.    The wound was copiously irrigated.   The wound was also anesthetized with 10 mL   of 0.25% Marcaine solution.  Exploration of the wound showed complete   transection of one of the tendons.  At this time, Dr. Federico Otto scrubbed in   to repair the tendon.  This part of the operation will be dictated   separately by him.    Following the tendon repair, the wound was again copiously irrigated with warm   saline solution.  Excellent Doppler flow signals were still obtainable over the   radial artery.  The wound was then closed with interrupted 3-0 nylon vertical   mattresses.  The wound was cleaned and sterile dressing was applied.  The   orthopedic service then applied a wrist splint.    ESTIMATED BLOOD LOSS:  10 mL    INTRAVENOUS FLUID:  One liter of crystalloids.    Sponge and instrument count was correct x2.    Patient was then awakened, extubated, and taken to recovery area in stable   condition.       ____________________________________     MD KAUR ROMO / TAMERA    DD:  04/03/2019 15:31:41  DT:  04/03/2019 15:50:42    D#:  [id number]  Job#:  [id number]    cc: FEDERICO OTTO MD

## 2019-04-03 NOTE — ED TRIAGE NOTES
Pt BIB brother from their restaurant. Pt picked up heavy glass that fell and broke and cut her left wrist and brought in for excessive bleeding.   Pt wheeled back to room and direct pressure being applied to wound with active bleeding still.   ERP called to room. CAT tourniquet applied just proximal to wound and unwrapped.   Vascular paged. PIV established and blood drawn and sent to lab.   Pt last ate around 0900 today.

## 2019-04-03 NOTE — PROGRESS NOTES
Pressure being held on the Left Radial Artery - directly to OR after consents and explaination by Surgeon and Anesthesiologist.  Consents signed.

## 2019-04-03 NOTE — ANESTHESIA TIME REPORT
Anesthesia Start and Stop Event Times     Date Time Event    4/3/2019 1410 Anesthesia Start     1525 Anesthesia Stop        Responsible Staff  04/03/19    Name Role Begin End    Romulo Nguyen M.D. Anesth 1410 1525        Preop Diagnosis (Free Text):  Pre-op Diagnosis     Left upper extremity arterial injury        Preop Diagnosis (Codes):  Diagnosis Information     Diagnosis Code(s):         Post op Diagnosis  Laceration of left radial artery  flexor tendon laceration left wrist    Premium Reason  A. 3PM - 7AM    Comments:

## 2019-04-03 NOTE — CONSULTS
DATE OF SERVICE:  04/03/2019    EMERGENCY ROOM CONSULTATION    REFERRING PHYSICIAN:  Chai eGnao MD, ER physician    REASON FOR CONSULTATION:  Left upper extremity arterial injury.    HISTORY OF PRESENT ILLNESS:  This is a pleasant 68-year-old female without   significant past medical history, who accidentally sustained a cut in the left   wrist from a broken glass.  Per report, she was brought into the emergency   room and active arterial bleeding was seen.  The tourniquet was applied.  I   was asked to see patient.  Upon speaking to the ER physician, I asked that the   tourniquet be removed and direct digital pressure applied to the bleeding   area.    PAST MEDICAL HISTORY:  Unremarkable.    ALLERGIES:  NKDA.    HOME MEDICATIONS:  Aspirin.    SOCIAL HISTORY:  Significant for occasional tobacco use.  Patient denies   alcohol abuse or illicit drug use.    FAMILY HISTORY:  Noncontributory.    REVIEW OF SYSTEMS:  Significant for left upper extremity pain.    PHYSICAL EXAMINATION:  GENERAL:  Patient is a pleasant female, in no apparent distress at the current   time.  HEENT:  Unremarkable.  LUNGS:  Clear to auscultation bilaterally.  CARDIOVASCULAR:  Exam showed regular rate and rhythm.  EXTREMITIES:  Upper extremity exam showed a largelaceration in the left wrist with no   active bleeding at the current time.  The wound was not explored due to the risks   of recurrent bleeding.    Lower extremities are without cyanosis, clubbing,   or edema.  ABDOMEN:  Exam showed the abdomen to be soft, nondistended, nontender with   normoactive bowel sounds.  NEUROLOGIC:  Exam is grossly nonfocal.  Patient was able to removal all of the   left digits.    ASSESSMENT:  Left wrist laceration with probable radial arterial involvement   and tendon involvement.    PLAN:  I had a long discussion with patient and her brother.  I recommended   that patient undergo exploration of the wound, with possible repair of the   radial artery  or ligation of the radial artery and tendon repair by orthopedic service if tendon   injury is involved.  The risks and benefits of the procedure were discussed.    Risks include but not limited to bleeding, infection, and perioperative   anesthetic complication.  Patient and her brother indicated understanding and   would like to proceed.  All questions were answered.    Thank you for the consultation.       ____________________________________     MD KAUR ROMO / TAMERA    DD:  04/03/2019 14:13:08  DT:  04/03/2019 14:34:18    D#:  [id number]  Job#:  [id number]    cc: Chai Genao MD

## 2019-04-03 NOTE — OP REPORT
DATE OF SERVICE:  04/03/2019    PREOPERATIVE DIAGNOSIS:  Left flexor carpi radialis tendon laceration.    POSTOPERATIVE DIAGNOSIS:  Left flexor carpi radialis tendon laceration.    PROCEDURE PERFORMED:  Open repair of flexor carpi radialis tendon.    SURGEON:  Federico Otto MD    ASSISTANT:  None.    ESTIMATED BLOOD LOSS:  None.    INDICATIONS:  This is a 68-year-old patient who underwent radial artery repair   by Dr. Colbert after he had a laceration that occurred earlier today and she   was found to have a FCR tendon laceration.  I was consulted for a tendon   repair.  Risks and benefits were discussed, which include but not limited to   bleeding, infection, neurovascular damage, pain, stiffness, re-rupture, and   need for the surgery.  They understand all these risks and wished to proceed.    DESCRIPTION OF PROCEDURE:  The patient had already been open for vascular   repair by Dr. Colbert when I was called in for repair.  The tendon ends were   repaired in a [place] type fashion with 2-0 Vicryl suture and then oversewn   with 2-0 Vicryl interrupted sutures.  The wound was closed in layers and a   well-padded volar splint was applied.  The patient tolerated the procedure   well.    POSTOPERATIVE PLAN:  The patient will be discharged home with plans for   initiation of physical therapy in 2 weeks and splint removal.       ____________________________________     FEDERICO OTTO MD PLA / NTS    DD:  04/03/2019 15:29:23  DT:  04/03/2019 15:40:26    D#:  [id number]  Job#:  [id number]

## 2019-04-04 VITALS
WEIGHT: 120.59 LBS | HEIGHT: 63 IN | DIASTOLIC BLOOD PRESSURE: 75 MMHG | BODY MASS INDEX: 21.37 KG/M2 | HEART RATE: 77 BPM | RESPIRATION RATE: 16 BRPM | SYSTOLIC BLOOD PRESSURE: 137 MMHG | OXYGEN SATURATION: 98 % | TEMPERATURE: 98.3 F

## 2019-04-04 PROCEDURE — 700111 HCHG RX REV CODE 636 W/ 250 OVERRIDE (IP): Performed by: SURGERY

## 2019-04-04 PROCEDURE — G0378 HOSPITAL OBSERVATION PER HR: HCPCS

## 2019-04-04 PROCEDURE — 96376 TX/PRO/DX INJ SAME DRUG ADON: CPT

## 2019-04-04 RX ORDER — AMOXICILLIN AND CLAVULANATE POTASSIUM 875; 125 MG/1; MG/1
1 TABLET, FILM COATED ORAL 2 TIMES DAILY
Qty: 10 TAB | Refills: 0 | Status: SHIPPED | OUTPATIENT
Start: 2019-04-04 | End: 2019-04-09

## 2019-04-04 RX ORDER — HYDROCODONE BITARTRATE AND ACETAMINOPHEN 5; 325 MG/1; MG/1
1 TABLET ORAL EVERY 6 HOURS PRN
Qty: 20 TAB | Refills: 0 | Status: SHIPPED | OUTPATIENT
Start: 2019-04-04 | End: 2019-04-07

## 2019-04-04 RX ADMIN — CEFAZOLIN SODIUM 2 G: 2 INJECTION, SOLUTION INTRAVENOUS at 04:55

## 2019-04-04 NOTE — PROGRESS NOTES
Received patient from PACU. This Rn took patient immediately to the bathroom as patient stated she need to go. While sitting on the toilet, pt went unresponsive, This RN and PACU RN carried patient back to bed, staff assit called and rapid response called. Vitals taken, . Rapid response at bedside at 1847.

## 2019-04-04 NOTE — CONSULTS
4/3/2019    Yuly Gotti is a 68 y.o. female who presents after cutting wrist on glass with a radial artery injury and tendon laceration at wrist and is here for operative management. Patient denies numbness, parasthesias, loss of concousness or other trauma    Past Medical History:   Diagnosis Date   • Cervical radiculopathy    • Foot pain, left    • High risk sexual behavior    • Insomnia, psychophysiological    • Menopause    • Tobacco dependence    • UTI (urinary tract infection)        Past Surgical History:   Procedure Laterality Date   • ARTERY EXPLORATION OR REPAIR Left 4/3/2019    Procedure: RADIAL ARTERY REPAIR;  Surgeon: Wally Colbert M.D.;  Location: SURGERY [facility];  Service: General   • TENDON REPAIR Left 4/3/2019    Procedure: REPAIR, TENDON;  Surgeon: Federico Saenz M.D.;  Location: SURGERY [facility];  Service: Orthopedics       Medications  No current facility-administered medications on file prior to encounter.      Current Outpatient Prescriptions on File Prior to Encounter   Medication Sig Dispense Refill   • vitamin D, Ergocalciferol, (DRISDOL) 77304 units Cap capsule TAKE ONE CAPSULE BY MOUTH EVERY 7 DAYS 12 Cap 0   • nitrofurantoin monohydr macro (MACROBID) 100 MG Cap Take 1 Cap by mouth 2 times a day. 10 Cap 0   • alendronate (FOSAMAX) 70 MG Tab Take 1 Tab by mouth every 7 days. 12 Tab 3   • aspirin 81 MG tablet Take 81 mg by mouth every day.         Allergies  Patient has no known allergies.    ROS  Left wrist pain. All other systems were reviewed and found to be negative    Family History   Problem Relation Age of Onset   • Cancer Sister        Social History     Social History   • Marital status: Unknown     Spouse name: N/A   • Number of children: N/A   • Years of education: N/A     Social History Main Topics   • Smoking status: Current Every Day Smoker     Packs/day: 0.25     Years: 40.00     Types: Cigarettes   • Smokeless tobacco: Never Used      Comment: 3 or 4  "cigs   • Alcohol use 1.2 oz/week     2 Glasses of wine per week   • Drug use: No   • Sexual activity: Not on file     Other Topics Concern   • Not on file     Social History Narrative   • No narrative on file       Physical Exam  Vitals  /62   Pulse 80   Temp 36.3 °C (97.3 °F)   Resp 16   Ht 1.6 m (5' 3\")   Wt 52.3 kg (115 lb 4.8 oz)   SpO2 100%   General: Well Developed, Well Nourished, no acute distress  HEENT: Normocephalic, atraumatic  Eyes: Anicteric, PERRLA, EOMI  Neck: Supple, nontender, no masses  Lungs: CTA, no wheezes or crackles  Heart: RRR, no murmurs, rubs or gallops  Abdomen: Soft, NT, ND  Pelvis: Stable to AP and Lateral Compression  Skin: Intact, no open wounds  Extremities: Left wrist laceration with active arterial bleeding and FCR tendon laceration  Neuro: Patient asleep and paralyzed in OR so no exam possible  Vascular: 2+uln, Capillary refill <2 seconds    Radiographs:  No orders to display       Laboratory Values  Recent Labs      04/03/19   1308  04/03/19   1904   WBC  9.5  9.4   RBC  4.29  3.95*   HEMOGLOBIN  13.6  12.3   HEMATOCRIT  39.8  37.0   MCV  92.8  93.7   MCH  31.7  31.1   MCHC  34.2  33.2*   RDW  46.2  46.1   PLATELETCT  285  244   MPV  10.8  10.7     No results for input(s): SODIUM, POTASSIUM, CHLORIDE, CO2, GLUCOSE, BUN, CPKTOTAL in the last 72 hours.  Recent Labs      04/03/19   1308  04/03/19   1904   APTT  33.7  30.4   INR  1.04  1.05         Impression:FCR tendon laceration    Plan:Operative intervention. Risks and benefits of surgery were discussed which include but are not limited to bleeding, infection, neurovascular damage, malunion, nonunion, instability, limb length discrepancy, DVT, PE, MI, Stroke and death. They understand these risks and wish to proceed.    "

## 2019-04-04 NOTE — PROGRESS NOTES
Dr. Colbert paged by this RN to clarify giving patient aspirin as patient bled today post op and was given protamine in PACU. Per Dr. Colbert, patient already received first dose in PACU.

## 2019-04-04 NOTE — CARE PLAN
Problem: Pain Management  Goal: Pain level will decrease to patient's comfort goal  Outcome: PROGRESSING AS EXPECTED  Assess pain Q2-4H, administer pain mediation as ordered, encourage patient to voice pain

## 2019-04-04 NOTE — OR NURSING
"Pt transferred to CDU in stable condition around 1630; as pt stood up blood was noted to drip out the bottom of the splint dressing onto the floor. Pt arm elevated as splint was removed; dressings saturated with blood; gentle pressure was applied and Dr. Colbert was called. Pt stated that her hand felt \"more numb\", fingers blanchable and cap refill <3 sec. Dr Colbert at bedside to see pt; pt transported back to PACU at 1730 with Dr Colbert; pt given Protomine 20mg IV by Connor. Pt stable without c/o pain; pt's L wrist splint reapplied by Colton Krause. Pt will go to [facility] when ready.  1805: Pt given Hydralazine 20mg IV for SBP>150 per Dr Colbert orders.  1825: Report given to Karrie CHILEL on [facility]; pt transported in stable condition.  "

## 2019-04-04 NOTE — PROGRESS NOTES
Called for bleeding from incision.    On physical exam, there was oozing from the incision.  Patient was transferred back to PACU since I was informed that, per hospital policy, Protamine cannot be given in CDU.    I slowly pushed in 20mg of Protamine in PACU.  Patient tolerated well with no blood pressure dropped.  No further bleeding observed.  New dressing applied.  I asked PACU nurse to contact [facility], for new wrist splint placement.  Multiphasic flow signals remained over left radial and ulnar arteries following Protamine infusion.    Patient is to be admitted to [facility].    Discussed with patient, family member, and PACU nurses.

## 2019-04-04 NOTE — PROGRESS NOTES
Dr. Colbert at bedside, Assessing site; MD wants to administer protamine. Per pharmacy must be administered on critical care floor, MD informed.  MD requesting pt to be transported back to recovery.  Pt escorted off unit via hospital bed with post-op nurses and MD.    Bed control informed

## 2019-04-04 NOTE — PROGRESS NOTES
Bedside report received.  Assessment complete.  A&O x 4. Patient calls appropriately.  Patient up with X1 assist.    Patient has 0/10 pain. Declines intervention at this time  Denies N&V. Tolerating regular diet.  Left arm wrapped with dressing and bandage.  + void, + flatus, - BM.  Patient denies SOB.  SCD's off.    Review plan with of care with patient. Call light and personal belongings with in reach. Hourly rounding in place. All needs met at this time.

## 2019-04-04 NOTE — DISCHARGE SUMMARY
DATE OF ADMISSION:  04/03/2019    DATE OF DISCHARGE:  04/04/2019    ADMITTING DIAGNOSIS:  Left wrist laceration with transection of left radial   artery and one tendon.    DISCHARGE DIAGNOSIS:  Left wrist laceration with transection of left radial   artery and one tendon.    PROCEDURES:  1.  Left wrist wound exploration and primary repair of left radial artery,   performed by Dr. Colbert.  2.  Repair of transected left wrist tendon, performed by Dr. Federico Saenz.    These procedures were performed on 04/03/2019.    HOSPITAL COURSE:  The patient is a pleasant 68-year-old female who presented   to the emergency room after she sustained an accidental laceration to her   wrist with evidence of arterial and tendon injury.  She was emergently taken   to the operating room and underwent exploration of the wound and primary   repair of the radial artery.  Dr. Federico Saenz was also consulted and   performed an intraoperative repair of the transected tendon.  Patient   tolerated the procedure well.  She developed small bleeding postoperatively,   which responded to protamine infusion.  She was admitted to the floor.  She   continued to do well.  By postop day #1, she was able to tolerate diet,   ambulating independently.  She had palpable left radial pulses with   multiphasic Doppler flow signals.  The perfusion of her hand was normal.  She   insisted on being discharged home and therefore discharged home.    DISCHARGE INSTRUCTIONS:  MEDICATION:  Resume home medications including aspirin.  Patient was   instructed that she may take regular Tylenol as needed for mild pain and take   Norco as prescribed for moderate pain.    Patient was also prescribed Augmentin 875 mg 1 p.o. b.i.d. x5 days.    ACTIVITY:  As tolerated except no lifting using the left hand.  Patient was   instructed to keep her left hand and arm elevated and straight.    Patient was instructed to keep the dressing and the splint on.    FOLLOWUP APPOINTMENT:   With me in 1 week and to follow up with Dr. Cierra Otto in 2 weeks.  Patient was instructed to call me for any problem in   the meantime.    Her family member was also present and discharge instruction also went over   with the family member as well.    CONDITION AT THE TIME OF DISCHARGE:  Stable.       ____________________________________     MD KAUR ROMO / TAMERA    DD:  04/04/2019 10:35:23  DT:  04/04/2019 16:10:05    D#:  [id number]  Job#:  [id number]    cc: CIERRA OTTO MD, VALERIE RIVERA MD

## 2019-04-04 NOTE — DISCHARGE INSTRUCTIONS
Discharge Instructions    Discharged to home by car with relative. Discharged via wheelchair, hospital escort: Yes.  Special equipment needed: Not Applicable    Ok to wear sling when up but when lying down- keep arm elevated and straight  Do NOT remove dressing or splint.     2) Resume home meds including one Aspirin a day.   May take regular Tylenol as needed for mild pain.   Take Norco as prescribed for moderate pain.   Take Antibiotic as prescribed.   3) Follow up with Dr. Colbert in 1 week.  Call [phone] for appointment and for any problem.   4) Follow up with Dr. Federico Saenz in 2 weeks.  Call for appointment.    Be sure to schedule a follow-up appointment with your primary care doctor or any specialists as instructed.     Discharge Plan:   Diet Plan: Discussed  Activity Level: Discussed  Smoking Cessation Offered: Patient Refused  Confirmed Follow up Appointment: Patient to Call and Schedule Appointment  Confirmed Symptoms Management: Discussed  Medication Reconciliation Updated: Yes  Influenza Vaccine Indication: Not indicated: Previously immunized this influenza season and > 8 years of age    I understand that a diet low in cholesterol, fat, and sodium is recommended for good health. Unless I have been given specific instructions below for another diet, I accept this instruction as my diet prescription.   Other diet: regular    Special Instructions: Discharge instructions for the Orthopedic Patient    Follow up with Primary Care Physician within 2 weeks of discharge to home, regarding:  Review of medications and diagnostic testing.  Surveillance for medical complications.  Workup and treatment of osteoporosis, if appropriate.     -Is this a Joint Replacement patient? No    -Is this patient being discharged with medication to prevent blood clots?  Yes, Aspirin Aspirin, ASA oral tablets  What is this medicine?  ASPIRIN (AS pir in) is a pain reliever. It is used to treat mild pain and fever. This medicine is  also used as directed by a doctor to prevent and to treat heart attacks, to prevent strokes, and to treat arthritis or inflammation.  This medicine may be used for other purposes; ask your health care provider or pharmacist if you have questions.  COMMON BRAND NAME(S): Aspir-Low, Aspir-Roslyn, Aspirtab, Cole Advanced Aspirin, Cole Aspirin, Cole Aspirin Extra Strength, Cole Aspirin Plus, Cole Extra Strength, Cole Extra Strength Plus, Cole Genuine Aspirin, Cole Womens Aspirin, Bufferin, Bufferin Extra Strength, Bufferin Low Dose  What should I tell my health care provider before I take this medicine?  They need to know if you have any of these conditions:  -anemia  -asthma  -bleeding problems  -child with chickenpox, the flu, or other viral infection  -diabetes  -gout  -if you frequently drink alcohol containing drinks  -kidney disease  -liver disease  -low level of vitamin K  -lupus  -smoke tobacco  -stomach ulcers or other problems  -an unusual or allergic reaction to aspirin, tartrazine dye, other medicines, dyes, or preservatives  -pregnant or trying to get pregnant  -breast-feeding  How should I use this medicine?  Take this medicine by mouth with a glass of water. Follow the directions on the package or prescription label. You can take this medicine with or without food. If it upsets your stomach, take it with food. Do not take your medicine more often than directed.  Talk to your pediatrician regarding the use of this medicine in children. While this drug may be prescribed for children as young as 12 years of age for selected conditions, precautions do apply. Children and teenagers should not use this medicine to treat chicken pox or flu symptoms unless directed by a doctor.  Patients over 65 years old may have a stronger reaction and need a smaller dose.  Overdosage: If you think you have taken too much of this medicine contact a poison control center or emergency room at once.  NOTE: This medicine is  only for you. Do not share this medicine with others.  What if I miss a dose?  If you are taking this medicine on a regular schedule and miss a dose, take it as soon as you can. If it is almost time for your next dose, take only that dose. Do not take double or extra doses.  What may interact with this medicine?  Do not take this medicine with any of the following medications:  -cidofovir  -ketorolac  -probenecid  This medicine may also interact with the following medications:  -alcohol  -alendronate  -bismuth subsalicylate  -flavocoxid  -herbal supplements like feverfew, garlic, aniyah, ginkgo biloba, horse chestnut  -medicines for diabetes or glaucoma like acetazolamide, methazolamide  -medicines for gout  -medicines that treat or prevent blood clots like enoxaparin, heparin, ticlopidine, warfarin  -other aspirin and aspirin-like medicines  -NSAIDs, medicines for pain and inflammation, like ibuprofen or naproxen  -pemetrexed  -sulfinpyrazone  -varicella live vaccine  This list may not describe all possible interactions. Give your health care provider a list of all the medicines, herbs, non-prescription drugs, or dietary supplements you use. Also tell them if you smoke, drink alcohol, or use illegal drugs. Some items may interact with your medicine.  What should I watch for while using this medicine?  If you are treating yourself for pain, tell your doctor or health care professional if the pain lasts more than 10 days, if it gets worse, or if there is a new or different kind of pain. Tell your doctor if you see redness or swelling. Also, check with your doctor if you have a fever that lasts for more than 3 days. Only take this medicine to prevent heart attacks or blood clotting if prescribed by your doctor or health care professional.  Do not take aspirin or aspirin-like medicines with this medicine. Too much aspirin can be dangerous. Always read the labels carefully.  This medicine can irritate your stomach or  cause bleeding problems. Do not smoke cigarettes or drink alcohol while taking this medicine. Do not lie down for 30 minutes after taking this medicine to prevent irritation to your throat.  If you are scheduled for any medical or dental procedure, tell your healthcare provider that you are taking this medicine. You may need to stop taking this medicine before the procedure.  This medicine may be used to treat migraines. If you take migraine medicines for 10 or more days a month, your migraines may get worse. Keep a diary of headache days and medicine use. Contact your healthcare professional if your migraine attacks occur more frequently.  What side effects may I notice from receiving this medicine?  Side effects that you should report to your doctor or health care professional as soon as possible:  -allergic reactions like skin rash, itching or hives, swelling of the face, lips, or tongue  -breathing problems  -changes in hearing, ringing in the ears  -confusion  -general ill feeling or flu-like symptoms  -pain on swallowing  -redness, blistering, peeling or loosening of the skin, including inside the mouth or nose  -signs and symptoms of bleeding such as bloody or black, tarry stools; red or dark-brown urine; spitting up blood or brown material that looks like coffee grounds; red spots on the skin; unusual bruising or bleeding from the eye, gums, or nose  -trouble passing urine or change in the amount of urine  -unusually weak or tired  -yellowing of the eyes or skin  Side effects that usually do not require medical attention (report to your doctor or health care professional if they continue or are bothersome):  -diarrhea or constipation  -headache  -nausea, vomiting  -stomach gas, heartburn  This list may not describe all possible side effects. Call your doctor for medical advice about side effects. You may report side effects to FDA at 9-793-FDA-5547.  Where should I keep my medicine?  Keep out of the reach of  children.  Store at room temperature between 15 and 30 degrees C (59 and 86 degrees F). Protect from heat and moisture. Do not use this medicine if it has a strong vinegar smell. Throw away any unused medicine after the expiration date.  NOTE: This sheet is a summary. It may not cover all possible information. If you have questions about this medicine, talk to your doctor, pharmacist, or health care provider.  © 2018 [employer]vier/Gold Standard (2014-08-19 11:30:31)      · Is patient discharged on Warfarin / Coumadin?   No     Depression / Suicide Risk    As you are discharged from this [facility] facility, it is important to learn how to keep safe from harming yourself.    Recognize the warning signs:  · Abrupt changes in personality, positive or negative- including increase in energy   · Giving away possessions  · Change in eating patterns- significant weight changes-  positive or negative  · Change in sleeping patterns- unable to sleep or sleeping all the time   · Unwillingness or inability to communicate  · Depression  · Unusual sadness, discouragement and loneliness  · Talk of wanting to die  · Neglect of personal appearance   · Rebelliousness- reckless behavior  · Withdrawal from people/activities they love  · Confusion- inability to concentrate     If you or a loved one observes any of these behaviors or has concerns about self-harm, here's what you can do:  · Talk about it- your feelings and reasons for harming yourself  · Remove any means that you might use to hurt yourself (examples: pills, rope, extension cords, firearm)  · Get professional help from the community (Mental Health, Substance Abuse, psychological counseling)  · Do not be alone:Call your Safe Contact- someone whom you trust who will be there for you.  · Call your local CRISIS HOTLINE 745-7739 or 797-712-7205  · Call your local Children's Mobile Crisis Response Team [place] (915) 830-4284 or www.[employer]  · Call the toll free  National Suicide Prevention Hotlines   · National Suicide Prevention Lifeline 864-180-ZLXB (9631)  · National Hope Line Network 800-SUICIDE (626-6794)  Amoxicillin; Clavulanic Acid tablets  What is this medicine?  AMOXICILLIN; CLAVULANIC ACID (a mox i JEFE in; OTONIEL garland piter ic AS id) is a penicillin antibiotic. It is used to treat certain kinds of bacterial infections. It will not work for colds, flu, or other viral infections.  This medicine may be used for other purposes; ask your health care provider or pharmacist if you have questions.  COMMON BRAND NAME(S): Augmentin  What should I tell my health care provider before I take this medicine?  They need to know if you have any of these conditions:  -bowel disease, like colitis  -kidney disease  -liver disease  -mononucleosis  -an unusual or allergic reaction to amoxicillin, penicillin, cephalosporin, other antibiotics, clavulanic acid, other medicines, foods, dyes, or preservatives  -pregnant or trying to get pregnant  -breast-feeding  How should I use this medicine?  Take this medicine by mouth with a full glass of water. Follow the directions on the prescription label. Take at the start of a meal. Do not crush or chew. If the tablet has a score line, you may cut it in half at the score line for easier swallowing. Take your medicine at regular intervals. Do not take your medicine more often than directed. Take all of your medicine as directed even if you think you are better. Do not skip doses or stop your medicine early.  Talk to your pediatrician regarding the use of this medicine in children. Special care may be needed.  Overdosage: If you think you have taken too much of this medicine contact a poison control center or emergency room at once.  NOTE: This medicine is only for you. Do not share this medicine with others.  What if I miss a dose?  If you miss a dose, take it as soon as you can. If it is almost time for your next dose, take only that dose. Do not  take double or extra doses.  What may interact with this medicine?  -allopurinol  -anticoagulants  -birth control pills  -methotrexate  -probenecid  This list may not describe all possible interactions. Give your health care provider a list of all the medicines, herbs, non-prescription drugs, or dietary supplements you use. Also tell them if you smoke, drink alcohol, or use illegal drugs. Some items may interact with your medicine.  What should I watch for while using this medicine?  Tell your doctor or health care professional if your symptoms do not improve.  Do not treat diarrhea with over the counter products. Contact your doctor if you have diarrhea that lasts more than 2 days or if it is severe and watery.  If you have diabetes, you may get a false-positive result for sugar in your urine. Check with your doctor or health care professional.  Birth control pills may not work properly while you are taking this medicine. Talk to your doctor about using an extra method of birth control.  What side effects may I notice from receiving this medicine?  Side effects that you should report to your doctor or health care professional as soon as possible:  -allergic reactions like skin rash, itching or hives, swelling of the face, lips, or tongue  -breathing problems  -dark urine  -fever or chills, sore throat  -redness, blistering, peeling or loosening of the skin, including inside the mouth  -seizures  -trouble passing urine or change in the amount of urine  -unusual bleeding, bruising  -unusually weak or tired  -white patches or sores in the mouth or throat  Side effects that usually do not require medical attention (report to your doctor or health care professional if they continue or are bothersome):  -diarrhea  -dizziness  -headache  -nausea, vomiting  -stomach upset  -vaginal or anal irritation  This list may not describe all possible side effects. Call your doctor for medical advice about side effects. You may  report side effects to FDA at 1-172-FDA-8606.  Where should I keep my medicine?  Keep out of the reach of children.  Store at room temperature below 25 degrees C (77 degrees F). Keep container tightly closed. Throw away any unused medicine after the expiration date.  NOTE: This sheet is a summary. It may not cover all possible information. If you have questions about this medicine, talk to your doctor, pharmacist, or health care provider.  © 2018 Elsevier/Gold Standard (2009-03-12 12:04:30)  Acetaminophen; Hydrocodone tablets or capsules  What is this medicine?  ACETAMINOPHEN; HYDROCODONE (a set a SE arden fen; akil droe KOE done) is a pain reliever. It is used to treat moderate to severe pain.  This medicine may be used for other purposes; ask your health care provider or pharmacist if you have questions.  COMMON BRAND NAME(S): Anexsia, Bancap HC, Ceta-Plus, Co-Gesic, Comfortpak, Dolagesic, Dolorex Forte, DuoCet, Hydrocet, Hydrogesic, Lorcet, Lorcet HD, Lorcet Plus, Lortab, Margesic H, Maxidone, Norco, Polygesic, Stagesic, Vanacet, Verdrocet, Vicodin, Vicodin ES, Vicodin HP, Xodol, Zydone  What should I tell my health care provider before I take this medicine?  They need to know if you have any of these conditions:  -brain tumor  -Crohn's disease, inflammatory bowel disease, or ulcerative colitis  -drug abuse or addiction  -head injury  -heart or circulation problems  -if you often drink alcohol  -kidney disease or problems going to the bathroom  -liver disease  -lung disease, asthma, or breathing problems  -an unusual or allergic reaction to acetaminophen, hydrocodone, other opioid analgesics, other medicines, foods, dyes, or preservatives  -pregnant or trying to get pregnant  -breast-feeding  How should I use this medicine?  Take this medicine by mouth with a glass of water. Follow the directions on the prescription label. You can take it with or without food. If it upsets your stomach, take it with food. Do not  take your medicine more often than directed.  A special MedGuide will be given to you by the pharmacist with each prescription and refill. Be sure to read this information carefully each time.  Talk to your pediatrician regarding the use of this medicine in children. Special care may be needed.  Overdosage: If you think you have taken too much of this medicine contact a poison control center or emergency room at once.  NOTE: This medicine is only for you. Do not share this medicine with others.  What if I miss a dose?  If you miss a dose, take it as soon as you can. If it is almost time for your next dose, take only that dose. Do not take double or extra doses.  What may interact with this medicine?  This medicine may interact with the following medications:  -alcohol  -antiviral medicines for HIV or AIDS  -atropine  -antihistamines for allergy, cough and cold  -certain antibiotics like erythromycin, clarithromycin  -certain medicines for anxiety or sleep  -certain medicines for bladder problems like oxybutynin, tolterodine  -certain medicines for depression like amitriptyline, fluoxetine, sertraline  -certain medicines for fungal infections like ketoconazole and itraconazole  -certain medicines for Parkinson's disease like benztropine, trihexyphenidyl  -certain medicines for seizures like carbamazepine, phenobarbital, phenytoin, primidone  -certain medicines for stomach problems like dicyclomine, hyoscyamine  -certain medicines for travel sickness like scopolamine  -general anesthetics like halothane, isoflurane, methoxyflurane, propofol  -ipratropium  -local anesthetics like lidocaine, pramoxine, tetracaine  -MAOIs like Carbex, Eldepryl, Marplan, Nardil, and Parnate  -medicines that relax muscles for surgery  -other medicines with acetaminophen  -other narcotic medicines for pain or cough  -phenothiazines like chlorpromazine, mesoridazine, prochlorperazine, thioridazine  -rifampin  This list may not describe all  possible interactions. Give your health care provider a list of all the medicines, herbs, non-prescription drugs, or dietary supplements you use. Also tell them if you smoke, drink alcohol, or use illegal drugs. Some items may interact with your medicine.  What should I watch for while using this medicine?  Tell your doctor or health care professional if your pain does not go away, if it gets worse, or if you have new or a different type of pain. You may develop tolerance to the medicine. Tolerance means that you will need a higher dose of the medicine for pain relief. Tolerance is normal and is expected if you take the medicine for a long time.  Do not suddenly stop taking your medicine because you may develop a severe reaction. Your body becomes used to the medicine. This does NOT mean you are addicted. Addiction is a behavior related to getting and using a drug for a non-medical reason. If you have pain, you have a medical reason to take pain medicine. Your doctor will tell you how much medicine to take. If your doctor wants you to stop the medicine, the dose will be slowly lowered over time to avoid any side effects.  There are different types of narcotic medicines (opiates). If you take more than one type at the same time or if you are taking another medicine that also causes drowsiness, you may have more side effects. Give your health care provider a list of all medicines you use. Your doctor will tell you how much medicine to take. Do not take more medicine than directed. Call emergency for help if you have problems breathing or unusual sleepiness.  Do not take other medicines that contain acetaminophen with this medicine. Always read labels carefully. If you have questions, ask your doctor or pharmacist.  If you take too much acetaminophen get medical help right away. Too much acetaminophen can be very dangerous and cause liver damage. Even if you do not have symptoms, it is important to get help right  away.  You may get drowsy or dizzy. Do not drive, use machinery, or do anything that needs mental alertness until you know how this medicine affects you. Do not stand or sit up quickly, especially if you are an older patient. This reduces the risk of dizzy or fainting spells. Alcohol may interfere with the effect of this medicine. Avoid alcoholic drinks.  The medicine will cause constipation. Try to have a bowel movement at least every 2 to 3 days. If you do not have a bowel movement for 3 days, call your doctor or health care professional.  Your mouth may get dry. Chewing sugarless gum or sucking hard candy, and drinking plenty of water may help. Contact your doctor if the problem does not go away or is severe.  What side effects may I notice from receiving this medicine?  Side effects that you should report to your doctor or health care professional as soon as possible:  -allergic reactions like skin rash, itching or hives, swelling of the face, lips, or tongue  -breathing problems  -confusion  -redness, blistering, peeling or loosening of the skin, including inside the mouth  -signs and symptoms of low blood pressure like dizziness; feeling faint or lightheaded, falls; unusually weak or tired  -trouble passing urine or change in the amount of urine  -yellowing of the eyes or skin  Side effects that usually do not require medical attention (report to your doctor or health care professional if they continue or are bothersome):  -constipation  -dry mouth  -nausea, vomiting  -tiredness  This list may not describe all possible side effects. Call your doctor for medical advice about side effects. You may report side effects to FDA at 8-071-FDA-8320.  Where should I keep my medicine?  Keep out of the reach of children. This medicine can be abused. Keep your medicine in a safe place to protect it from theft. Do not share this medicine with anyone. Selling or giving away this medicine is dangerous and against the  law.  This medicine may cause accidental overdose and death if it taken by other adults, children, or pets. Mix any unused medicine with a substance like cat litter or coffee grounds. Then throw the medicine away in a sealed container like a sealed bag or a coffee can with a lid. Do not use the medicine after the expiration date.  Store at room temperature between 15 and 30 degrees C (59 and 86 degrees F).  NOTE: This sheet is a summary. It may not cover all possible information. If you have questions about this medicine, talk to your doctor, pharmacist, or health care provider.  © 2018 [employer]vier/Gold Standard (2016-09-09 10:02:16)

## 2019-04-04 NOTE — CARE PLAN
Problem: Safety  Goal: Will remain free from falls  Outcome: PROGRESSING AS EXPECTED  Bed locked and in lowest position, side rails up x2, call light within reach, patient educated not to exit bed without assistance     Problem: Pain Management  Goal: Pain level will decrease to patient's comfort goal  Outcome: PROGRESSING AS EXPECTED  Assess pain Q2-4H, administer pain medication as ordered, encourage patient to voice pain, heat and ice.

## 2019-04-04 NOTE — CODE DOCUMENTATION
Pt with recent repair of radial artery, protamine given in PACU due to bleeding from site.  Upon arrival to pt room, in BR pt reported feeling faint, and had syncopal episode on toilet, with approximate LOC 1 min.  No fall, pt carried back to bed.

## 2019-04-04 NOTE — PROGRESS NOTES
This RN notified Dr. Colbert that patient went unresponsive for about 2 minutes immediately after transfer to the unit when patient on the toilet. RN notified MD of patients current status, vitals and complaints of numbness and tingling in the left hand. No new orders at this time. RN will continue to monitor.

## 2019-04-04 NOTE — PROGRESS NOTES
"Per Dr. Robles does not want pt on CDU floor. States,\" I didn't realize this is where she was going.\"  "

## 2019-04-04 NOTE — OR NURSING
1835: Pt up to void on GSU and had a vagal episode on toilet; pt carried to bed and a rapid response was called. Pt awake and talking a few minutes later.

## 2019-04-04 NOTE — PROGRESS NOTES
POD#1  S/p vascular repair and tendon repair  Splint for 14 days  Sling for comfort  Case coordination

## 2019-04-05 NOTE — PROGRESS NOTES
Dr. Colbert notified that patient requesting sling for arm; Dr. Colbert approved sling for LUE and patient instructed to wear when OOB but when in bed pt needs to keep arm straight and elevate. Pt educated on s/s infections and notify MD of s/s infection or bleeding. Pt instructed to keep dressing c/d/i, cover when shower, and dressing will be changed at Dr. Colbert f/u appt in 1 week. Pt instructed to call and make appt with Dr. Colbert in 1 week and Dr. Saenz in 2 weeks. Pt verbalized understanding of discharge instructions and did not need . Sister-in-law at bedside and verbalized understanding. Pt instructed of NWB to LUE. Pt had copy of scripts for Norco and antibiotic. Pt instructed on methods to prevent constipation.

## 2019-05-16 DIAGNOSIS — K21.9 GASTROESOPHAGEAL REFLUX DISEASE WITHOUT ESOPHAGITIS: ICD-10-CM

## 2019-05-16 RX ORDER — OMEPRAZOLE 20 MG/1
CAPSULE, DELAYED RELEASE ORAL
Qty: 90 CAP | Refills: 1 | Status: SHIPPED | OUTPATIENT
Start: 2019-05-16 | End: 2020-03-26

## 2019-05-16 NOTE — TELEPHONE ENCOUNTER
Patient Seen: 12/24/19 With Dr. Orozco  Next Appointment: None  Was the patient seen in the last year in this department? Yes     Does patient have an active prescription for medications requested? No     Received Request Via: Pharmacy

## 2019-06-03 DIAGNOSIS — E55.9 VITAMIN D DEFICIENCY: ICD-10-CM

## 2019-06-03 RX ORDER — ERGOCALCIFEROL 1.25 MG/1
CAPSULE ORAL
Refills: 0 | OUTPATIENT
Start: 2019-06-03

## 2019-07-10 ENCOUNTER — HOSPITAL ENCOUNTER (OUTPATIENT)
Dept: HOSPITAL 8 - CVU | Age: 69
Discharge: HOME | End: 2019-07-10
Attending: SURGERY
Payer: MEDICARE

## 2019-07-10 DIAGNOSIS — Z79.899: ICD-10-CM

## 2019-07-10 DIAGNOSIS — I70.202: Primary | ICD-10-CM

## 2019-07-10 PROCEDURE — 93931 UPPER EXTREMITY STUDY: CPT

## 2020-03-24 ENCOUNTER — OFFICE VISIT (OUTPATIENT)
Dept: MEDICAL GROUP | Facility: PHYSICIAN GROUP | Age: 70
End: 2020-03-24
Payer: MEDICARE

## 2020-03-24 VITALS
DIASTOLIC BLOOD PRESSURE: 88 MMHG | OXYGEN SATURATION: 96 % | TEMPERATURE: 97.9 F | WEIGHT: 116.6 LBS | RESPIRATION RATE: 16 BRPM | SYSTOLIC BLOOD PRESSURE: 132 MMHG | HEART RATE: 82 BPM | HEIGHT: 62 IN | BODY MASS INDEX: 21.46 KG/M2

## 2020-03-24 DIAGNOSIS — K21.9 GASTROESOPHAGEAL REFLUX DISEASE WITHOUT ESOPHAGITIS: ICD-10-CM

## 2020-03-24 DIAGNOSIS — M81.0 AGE-RELATED OSTEOPOROSIS WITHOUT CURRENT PATHOLOGICAL FRACTURE: ICD-10-CM

## 2020-03-24 DIAGNOSIS — Z00.00 ENCOUNTER FOR PHYSICAL EXAMINATION: ICD-10-CM

## 2020-03-24 DIAGNOSIS — Z83.3 FAMILY HISTORY OF DIABETES MELLITUS: ICD-10-CM

## 2020-03-24 DIAGNOSIS — F17.200 TOBACCO DEPENDENCE: ICD-10-CM

## 2020-03-24 PROBLEM — Z91.89 HIGH RISK FOR HIP FRACTURE: Status: RESOLVED | Noted: 2018-09-05 | Resolved: 2020-03-24

## 2020-03-24 PROBLEM — S55.102A INJURY OF LEFT RADIAL ARTERY: Status: RESOLVED | Noted: 2019-04-03 | Resolved: 2020-03-24

## 2020-03-24 PROBLEM — M85.88 OSTEOPENIA OF LUMBAR SPINE: Status: ACTIVE | Noted: 2020-03-24

## 2020-03-24 PROCEDURE — 99204 OFFICE O/P NEW MOD 45 MIN: CPT | Performed by: FAMILY MEDICINE

## 2020-03-24 RX ORDER — FAMOTIDINE 20 MG/1
20 TABLET, FILM COATED ORAL DAILY
Qty: 30 TAB | Refills: 3 | Status: SHIPPED | OUTPATIENT
Start: 2020-03-24 | End: 2020-03-26

## 2020-03-24 ASSESSMENT — PATIENT HEALTH QUESTIONNAIRE - PHQ9: CLINICAL INTERPRETATION OF PHQ2 SCORE: 0

## 2020-03-24 NOTE — ASSESSMENT & PLAN NOTE
This is a chronic problem  The patient has a hx of GERD and has taken omeprazole as needed. However, she hasn't taken it.   Avoids PPI in light of osteoporosis history.

## 2020-03-24 NOTE — ASSESSMENT & PLAN NOTE
This is a chronic problem.  She smokes about 7 cigarettes on a daily basis.  Has done this for about 10 years.   She is in the pre contemplating state.

## 2020-03-24 NOTE — PROGRESS NOTES
"Subjective:     Chief Complaint   Patient presents with   • Establish Care     \"check up\"       HPI:   Yuly presents today to discuss the following.  Prior PCP: PCP from [facility] internal medicine residency    Age-related osteoporosis without current pathological fracture  This is a chronic problem.  The patient has a long hx of osteoporosis to the lumbar spine and took Fosamax for 6 years. Had repeat DXA 2018 and only showed osteopenia to the lumbar spine.  She is no longer taking fosamax.   Is no longer taking vitamin D.     Gastroesophageal reflux disease without esophagitis  This is a chronic problem  The patient has a hx of GERD and has taken omeprazole as needed. However, she hasn't taken it.   Avoids PPI in light of osteoporosis history.     Tobacco dependence  This is a chronic problem.  She smokes about 7 cigarettes on a daily basis.  Has done this for about 10 years.   She is in the pre contemplating state.       Past Medical History:   Diagnosis Date   • Cervical radiculopathy    • Foot pain, left    • High risk sexual behavior    • Insomnia, psychophysiological    • Menopause    • Tobacco dependence    • UTI (urinary tract infection)        Social History     Tobacco Use   • Smoking status: Current Every Day Smoker     Packs/day: 0.25     Types: Cigarettes   • Smokeless tobacco: Never Used   Substance Use Topics   • Alcohol use: Yes     Alcohol/week: 0.6 oz     Types: 1 Glasses of wine per week   • Drug use: Never       Current Outpatient Medications Ordered in Epic   Medication Sig Dispense Refill   • famotidine (PEPCID) 20 MG Tab Take 1 Tab by mouth every day. 30 Tab 3   • omeprazole (PRILOSEC) 20 MG delayed-release capsule TAKE 1 CAPSULE BY MOUTH EVERY DAY (Patient not taking: Reported on 3/24/2020) 90 Cap 1   • Omega-3 Fatty Acids (FISH OIL) 1000 MG Cap capsule Take 1,000 mg by mouth 3 times a day, with meals.     • vitamin D, Ergocalciferol, (DRISDOL) 24740 units Cap capsule TAKE ONE CAPSULE BY MOUTH " "EVERY 7 DAYS (Patient not taking: Reported on 3/24/2020) 12 Cap 0   • alendronate (FOSAMAX) 70 MG Tab Take 1 Tab by mouth every 7 days. (Patient not taking: Reported on 3/24/2020) 12 Tab 3     No current Epic-ordered facility-administered medications on file.        Allergies:  Patient has no known allergies.    Health Maintenance: Completed    ROS:  Gen: no fevers/chills, no changes in weight  Eyes: no changes in vision  ENT: no sore throat, no hearing loss, no bloody nose  Pulm: no sob, no cough  CV: no chest pain, no palpitations  GI: no nausea/vomiting, no diarrhea  : no dysuria  MSk: no myalgias  Skin: no rash  Neuro: no headaches, no numbness/tingling      Objective:     Exam:  /88 (BP Location: Left arm, Patient Position: Sitting, BP Cuff Size: Adult)   Pulse 82   Temp 36.6 °C (97.9 °F) (Temporal)   Resp 16   Ht 1.575 m (5' 2\")   Wt 52.9 kg (116 lb 9.6 oz)   LMP 01/01/1989   SpO2 96%   BMI 21.33 kg/m²  Body mass index is 21.33 kg/m².    Gen: Alert and oriented, No apparent distress.  HENT: TMs are clear. Oropharynx is w/o erythema or exudates. Neck is supple without cervical lymphadenopathy. No JVD.   Eyes: PERRLA  Lungs: Normal effort, CTA bilaterally, no wheezes, rhonchi, or rales  CV: Regular rate and rhythm. No murmurs, rubs, or gallops.  Abdomen: Soft, nontender, nondistended. Normal bowel sounds. Liver and spleen are not palpable  Ext: No clubbing, cyanosis, edema.  Skin: no rash, lesions or ulcers  Neuro: Moves all extremities.  Psych: AAOx3    Assessment & Plan:     69 y.o. female with the following -     1. Age-related osteoporosis without current pathological fracture  This is a chronic, stable condition.  The patient has a history of osteoporosis to the lumbar spine and has been on Fosamax for the past 6 years.  Last DEXA scan was in 2018 which only showed osteopenia.  As such she has been on a holiday ever since.  She is asymptomatic.  Was on vitamin D supplementation but she " suspended this as well.  I will establish baseline labs today.  - CBC WITHOUT DIFFERENTIAL; Future  - Comp Metabolic Panel; Future  - VITAMIN D,25 HYDROXY; Future    2. Gastroesophageal reflux disease without esophagitis  There is a chronic, stable condition.  The patient has a history of GERD controlled with omeprazole as needed.  I mentioned that PPI may exacerbate osteoporosis.  As such we will discontinue and switch to an H2 blocker instead.  - CBC WITHOUT DIFFERENTIAL; Future  - Comp Metabolic Panel; Future  - VITAMIN D,25 HYDROXY; Future  - famotidine (PEPCID) 20 MG Tab; Take 1 Tab by mouth every day.  Dispense: 30 Tab; Refill: 3    3. Tobacco dependence  There is a chronic, stable condition.  The patient has a history of an approximate 10-year pack history.  She is in the pre-contemplative phase of decision making.  We will continue to address this as we build more rapport.  - CBC WITHOUT DIFFERENTIAL; Future  - Comp Metabolic Panel; Future  - VITAMIN D,25 HYDROXY; Future    4. Family history of diabetes mellitus  5. Encounter for physical examination  Based on labs will be established today.  - CBC WITHOUT DIFFERENTIAL; Future  - Comp Metabolic Panel; Future  - VITAMIN D,25 HYDROXY; Future      Return in about 3 months (around 6/24/2020) for FU on conditions .    Please note that this dictation was created using voice recognition software. I have made every reasonable attempt to correct obvious errors, but I expect that there are errors of grammar and possibly content that I did not discover before finalizing the note.

## 2020-03-24 NOTE — ASSESSMENT & PLAN NOTE
This is a chronic problem.  The patient has a long hx of osteoporosis to the lumbar spine and took Fosamax for 6 years. Had repeat DXA 2018 and only showed osteopenia to the lumbar spine.  She is no longer taking fosamax.   Is no longer taking vitamin D.

## 2020-03-26 RX ORDER — CIMETIDINE 400 MG/1
400 TABLET, FILM COATED ORAL 2 TIMES DAILY
Qty: 60 TAB | Refills: 3 | Status: SHIPPED | OUTPATIENT
Start: 2020-03-26 | End: 2020-06-25

## 2020-06-23 ENCOUNTER — HOSPITAL ENCOUNTER (OUTPATIENT)
Dept: LAB | Facility: MEDICAL CENTER | Age: 70
End: 2020-06-23
Attending: FAMILY MEDICINE
Payer: MEDICARE

## 2020-06-23 DIAGNOSIS — Z83.3 FAMILY HISTORY OF DIABETES MELLITUS: ICD-10-CM

## 2020-06-23 DIAGNOSIS — M81.0 AGE-RELATED OSTEOPOROSIS WITHOUT CURRENT PATHOLOGICAL FRACTURE: ICD-10-CM

## 2020-06-23 DIAGNOSIS — Z00.00 ENCOUNTER FOR PHYSICAL EXAMINATION: ICD-10-CM

## 2020-06-23 DIAGNOSIS — F17.200 TOBACCO DEPENDENCE: ICD-10-CM

## 2020-06-23 DIAGNOSIS — K21.9 GASTROESOPHAGEAL REFLUX DISEASE WITHOUT ESOPHAGITIS: ICD-10-CM

## 2020-06-23 LAB
25(OH)D3 SERPL-MCNC: 40 NG/ML (ref 30–100)
ALBUMIN SERPL BCP-MCNC: 4.5 G/DL (ref 3.2–4.9)
ALBUMIN/GLOB SERPL: 1.8 G/DL
ALP SERPL-CCNC: 49 U/L (ref 30–99)
ALT SERPL-CCNC: 14 U/L (ref 2–50)
ANION GAP SERPL CALC-SCNC: 11 MMOL/L (ref 7–16)
AST SERPL-CCNC: 19 U/L (ref 12–45)
BILIRUB SERPL-MCNC: 0.6 MG/DL (ref 0.1–1.5)
BUN SERPL-MCNC: 6 MG/DL (ref 8–22)
CALCIUM SERPL-MCNC: 9.1 MG/DL (ref 8.5–10.5)
CHLORIDE SERPL-SCNC: 95 MMOL/L (ref 96–112)
CO2 SERPL-SCNC: 25 MMOL/L (ref 20–33)
CREAT SERPL-MCNC: 0.42 MG/DL (ref 0.5–1.4)
ERYTHROCYTE [DISTWIDTH] IN BLOOD BY AUTOMATED COUNT: 45.3 FL (ref 35.9–50)
FASTING STATUS PATIENT QL REPORTED: NORMAL
GLOBULIN SER CALC-MCNC: 2.5 G/DL (ref 1.9–3.5)
GLUCOSE SERPL-MCNC: 100 MG/DL (ref 65–99)
HCT VFR BLD AUTO: 40.1 % (ref 37–47)
HGB BLD-MCNC: 13.8 G/DL (ref 12–16)
MCH RBC QN AUTO: 31.7 PG (ref 27–33)
MCHC RBC AUTO-ENTMCNC: 34.4 G/DL (ref 33.6–35)
MCV RBC AUTO: 92 FL (ref 81.4–97.8)
PLATELET # BLD AUTO: 232 K/UL (ref 164–446)
PMV BLD AUTO: 9.8 FL (ref 9–12.9)
POTASSIUM SERPL-SCNC: 4.4 MMOL/L (ref 3.6–5.5)
PROT SERPL-MCNC: 7 G/DL (ref 6–8.2)
RBC # BLD AUTO: 4.36 M/UL (ref 4.2–5.4)
SODIUM SERPL-SCNC: 131 MMOL/L (ref 135–145)
WBC # BLD AUTO: 6 K/UL (ref 4.8–10.8)

## 2020-06-23 PROCEDURE — 36415 COLL VENOUS BLD VENIPUNCTURE: CPT

## 2020-06-23 PROCEDURE — 80053 COMPREHEN METABOLIC PANEL: CPT

## 2020-06-23 PROCEDURE — 85027 COMPLETE CBC AUTOMATED: CPT

## 2020-06-23 PROCEDURE — 82306 VITAMIN D 25 HYDROXY: CPT

## 2020-06-25 ENCOUNTER — OFFICE VISIT (OUTPATIENT)
Dept: MEDICAL GROUP | Facility: PHYSICIAN GROUP | Age: 70
End: 2020-06-25
Payer: MEDICARE

## 2020-06-25 ENCOUNTER — HOSPITAL ENCOUNTER (OUTPATIENT)
Dept: LAB | Facility: MEDICAL CENTER | Age: 70
End: 2020-06-25
Attending: FAMILY MEDICINE
Payer: MEDICARE

## 2020-06-25 VITALS
OXYGEN SATURATION: 98 % | HEART RATE: 84 BPM | WEIGHT: 118 LBS | DIASTOLIC BLOOD PRESSURE: 80 MMHG | RESPIRATION RATE: 16 BRPM | TEMPERATURE: 98.4 F | BODY MASS INDEX: 21.71 KG/M2 | SYSTOLIC BLOOD PRESSURE: 122 MMHG | HEIGHT: 62 IN

## 2020-06-25 DIAGNOSIS — E87.1 HYPONATREMIA: ICD-10-CM

## 2020-06-25 LAB
OSMOLALITY UR: 125 MOSM/KG H2O (ref 300–900)
SODIUM UR-SCNC: 21 MMOL/L

## 2020-06-25 PROCEDURE — 84300 ASSAY OF URINE SODIUM: CPT

## 2020-06-25 PROCEDURE — 83930 ASSAY OF BLOOD OSMOLALITY: CPT

## 2020-06-25 PROCEDURE — 99214 OFFICE O/P EST MOD 30 MIN: CPT | Performed by: FAMILY MEDICINE

## 2020-06-25 PROCEDURE — 83935 ASSAY OF URINE OSMOLALITY: CPT

## 2020-06-25 PROCEDURE — 36415 COLL VENOUS BLD VENIPUNCTURE: CPT

## 2020-06-25 RX ORDER — ASPIRIN 81 MG/1
81 TABLET, CHEWABLE ORAL DAILY
COMMUNITY

## 2020-06-25 ASSESSMENT — FIBROSIS 4 INDEX: FIB4 SCORE: 1.55

## 2020-06-25 NOTE — ASSESSMENT & PLAN NOTE
This is a chronic problem.  Symptom onset: The patient was noted to have persistent hyponatremia with low chloride over the years.  Symptoms: None  Modifying factors: She keeps a low sodium diet  She is not on any medicines  Treatments: None  Associated symptoms: She is asymptomatic

## 2020-06-25 NOTE — PROGRESS NOTES
"Subjective:     Chief Complaint   Patient presents with   • Follow-Up   • Lab Results       HPI:   Yuly presents today to discuss the following.    Hyponatremia  This is a chronic problem.  Symptom onset: The patient was noted to have persistent hyponatremia with low chloride over the years.  Symptoms: None  Modifying factors: She keeps a low sodium diet  She is not on any medicines  Treatments: None  Associated symptoms: She is asymptomatic             Past Medical History:   Diagnosis Date   • Cervical radiculopathy    • Foot pain, left    • High risk sexual behavior    • Insomnia, psychophysiological    • Menopause    • Tobacco dependence    • UTI (urinary tract infection)        Social History     Tobacco Use   • Smoking status: Current Every Day Smoker     Packs/day: 0.25     Types: Cigarettes   • Smokeless tobacco: Never Used   Substance Use Topics   • Alcohol use: Yes     Alcohol/week: 0.6 oz     Types: 1 Glasses of wine per week   • Drug use: Never       Current Outpatient Medications Ordered in Epic   Medication Sig Dispense Refill   • aspirin (ASPIRIN 81) 81 MG Chew Tab chewable tablet Take 81 mg by mouth every day.     • Omega-3 Fatty Acids (FISH OIL) 1000 MG Cap capsule Take 1,000 mg by mouth 3 times a day, with meals.       No current Epic-ordered facility-administered medications on file.        Allergies:  Patient has no known allergies.    Health Maintenance: Completed    ROS:  Gen: no fevers/chills, no changes in weight  Eyes: no changes in vision  ENT: no sore throat, no hearing loss, no bloody nose  Pulm: no sob, no cough  CV: no chest pain, no palpitations    Objective:     Exam:  /80 (BP Location: Left arm, Patient Position: Sitting, BP Cuff Size: Adult)   Pulse 84   Temp 36.9 °C (98.4 °F) (Temporal)   Resp 16   Ht 1.575 m (5' 2\")   Wt 53.5 kg (118 lb)   LMP 01/01/1989   SpO2 98%   BMI 21.58 kg/m²  Body mass index is 21.58 kg/m².    Constitutional: Alert, no distress, " well-groomed.  Skin: Warm, dry, good turgor, no rashes in visible areas.  Eye: Equal, round and reactive, conjunctiva clear, lids normal.  ENMT: Lips without lesions, good dentition, moist mucous membranes.  Neck: Trachea midline, no masses, no thyromegaly.  Respiratory: Unlabored respiratory effort, no cough.  MSK: Normal gait, moves all extremities.  Neuro: Grossly non-focal.   Psych: Alert and oriented x3, normal affect and mood.        Assessment & Plan:     69 y.o. female with the following -     1. Hyponatremia  This is a new problem.  Per lab evaluation the patient was found to be hyponatremic to 131 with normal glucose.  And hypochloremic of 95.  Per chart review the patient has had this 2 years ago as well.  Etiology is not entirely clear.  She is not on any hydrochlorothiazide.  She does keep a low salt diet.  I will evaluate for potential SIADH.  In the meantime I recommend she increases her salt intake and attempt fluid restriction.  She is asymptomatic.  Return precautions given today.  - OSMOLALITY SERUM; Future  - OSMOLALITY URINE; Future  - URINE SODIUM RANDOM; Future      Return in about 6 months (around 12/25/2020) for FU on hyponatremia.    Please note that this dictation was created using voice recognition software. I have made every reasonable attempt to correct obvious errors, but I expect that there are errors of grammar and possibly content that I did not discover before finalizing the note.

## 2020-06-26 DIAGNOSIS — E87.1 HYPONATREMIA: ICD-10-CM

## 2020-06-26 LAB — OSMOLALITY SERPL: 276 MOSM/KG H2O (ref 278–298)

## 2020-06-29 ENCOUNTER — TELEPHONE (OUTPATIENT)
Dept: MEDICAL GROUP | Facility: PHYSICIAN GROUP | Age: 70
End: 2020-06-29

## 2020-06-29 NOTE — TELEPHONE ENCOUNTER
Phone Number Called: [phone] (home)     Call outcome: Spoke to patient regarding message below.    Message: patient understood and will get labs done in 3 mos

## 2020-06-29 NOTE — TELEPHONE ENCOUNTER
----- Message from Darwin Paulson M.D. sent at 6/26/2020  2:16 PM PDT -----  Please call patient to let know:      I know, [place]-speaking.  Thank you!    Please let her know that based on the studies she just completed and looks like she may be deficient on salt intake.  Let us go ahead and increase her salt intake.  In about 3 months from now we should repeat the blood test to see if the low sodium has resolved.  I will place the orders for her to complete them there will be waiting for her.

## 2020-06-29 NOTE — TELEPHONE ENCOUNTER
VOICEMAIL  1. Caller Name: Yuly Gotti  Call Back Number: [phone] (home)     2. Message:  LVM to call back.     3. Patient approves office to leave a detailed voicemail/MyChart message: yes

## 2020-08-24 ENCOUNTER — OFFICE VISIT (OUTPATIENT)
Dept: MEDICAL GROUP | Facility: PHYSICIAN GROUP | Age: 70
End: 2020-08-24
Payer: MEDICARE

## 2020-08-24 VITALS
WEIGHT: 114.4 LBS | RESPIRATION RATE: 16 BRPM | OXYGEN SATURATION: 97 % | HEIGHT: 62 IN | DIASTOLIC BLOOD PRESSURE: 78 MMHG | BODY MASS INDEX: 21.05 KG/M2 | HEART RATE: 86 BPM | SYSTOLIC BLOOD PRESSURE: 126 MMHG | TEMPERATURE: 98.6 F

## 2020-08-24 DIAGNOSIS — R21 RASH: ICD-10-CM

## 2020-08-24 DIAGNOSIS — E87.1 HYPONATREMIA: ICD-10-CM

## 2020-08-24 DIAGNOSIS — R79.9 ABNORMAL FINDING OF BLOOD CHEMISTRY, UNSPECIFIED: ICD-10-CM

## 2020-08-24 PROCEDURE — 99214 OFFICE O/P EST MOD 30 MIN: CPT | Performed by: FAMILY MEDICINE

## 2020-08-24 RX ORDER — KETOCONAZOLE 20 MG/G
CREAM TOPICAL
Qty: 30 G | Refills: 3 | Status: SHIPPED | OUTPATIENT
Start: 2020-08-24 | End: 2020-08-28 | Stop reason: SDUPTHER

## 2020-08-24 RX ORDER — HYDROXYZINE HYDROCHLORIDE 25 MG/1
25 TABLET, FILM COATED ORAL 3 TIMES DAILY PRN
Qty: 30 TAB | Refills: 0 | Status: SHIPPED | OUTPATIENT
Start: 2020-08-24 | End: 2021-01-25

## 2020-08-24 ASSESSMENT — FIBROSIS 4 INDEX: FIB4 SCORE: 1.58

## 2020-08-24 NOTE — PROGRESS NOTES
Subjective:     Chief Complaint   Patient presents with   • Rash     1+ week bilat legs        HPI:   Yuly presents today to discuss the following.    Rash  This is a new condition.  Symptom onset: The patient has had a rash that is very itchy over the past 2 months that is getting worse. It is erupting and progressing to different areas of her body. Now she has similar rash to her upper inner thighs.   Current symptoms: Circular, well defined rash that is erythematous   Since onset symptoms are: Unchanged  Modifying factors: Had a cat but passed away 1 month ago.  Treatments tried: Tried hydrocortisone cream  Associated symptoms: Denies any      Hyponatremia  This is a chronic issue  Was found to have low Na with low chloride over the year  She is asymptomatic  She does keep a low sodium diet  She is not on any medicines   Pending labs      Past Medical History:   Diagnosis Date   • Cervical radiculopathy    • Foot pain, left    • High risk sexual behavior    • Insomnia, psychophysiological    • Menopause    • Tobacco dependence    • UTI (urinary tract infection)        Social History     Tobacco Use   • Smoking status: Current Every Day Smoker     Packs/day: 0.25     Types: Cigarettes   • Smokeless tobacco: Never Used   Substance Use Topics   • Alcohol use: Yes     Alcohol/week: 0.6 oz     Types: 1 Glasses of wine per week   • Drug use: Never       Current Outpatient Medications Ordered in Epic   Medication Sig Dispense Refill   • ketoconazole (NIZORAL) 2 % Cream Apply thin layer affected areas twice daily for 4 weeks. 30 g 3   • hydrOXYzine HCl (ATARAX) 25 MG Tab Take 1 Tab by mouth 3 times a day as needed for Itching. 30 Tab 0   • aspirin (ASPIRIN 81) 81 MG Chew Tab chewable tablet Take 81 mg by mouth every day.     • Omega-3 Fatty Acids (FISH OIL) 1000 MG Cap capsule Take 1,000 mg by mouth 3 times a day, with meals.       No current Epic-ordered facility-administered medications on file.   "      Allergies:  Patient has no known allergies.    Health Maintenance: Completed    ROS:  Gen: no fevers/chills, no changes in weight  Eyes: no changes in vision  ENT: no sore throat, no hearing loss, no bloody nose  Pulm: no sob, no cough  CV: no chest pain, no palpitations  GI: no nausea/vomiting, no diarrhea      Objective:     Exam:  /78 (BP Location: Left arm, Patient Position: Sitting, BP Cuff Size: Adult)   Pulse 86   Temp 37 °C (98.6 °F) (Temporal)   Resp 16   Ht 1.575 m (5' 2\")   Wt 51.9 kg (114 lb 6.4 oz)   LMP 01/01/1989   SpO2 97%   BMI 20.92 kg/m²  Body mass index is 20.92 kg/m².    Constitutional: Alert, no distress, well-groomed.  Skin: Inspection of her lower extremities shows evidence of a circular erythematous, scaly rash resembling ringworm.  Left posterior ankle is worse than the right.  A similar less severe rashes appreciated in the inner upper thighs bilaterally.  Eye: Equal, round and reactive, conjunctiva clear, lids normal.  ENMT: Lips without lesions, good dentition, moist mucous membranes.  Neck: Trachea midline, no masses, no thyromegaly.  Respiratory: Unlabored respiratory effort, no cough.  MSK: Normal gait, moves all extremities.  Neuro: Grossly non-focal.   Psych: Alert and oriented x3, normal affect and mood.          Assessment & Plan:     70 y.o. female with the following -     1. Rash  This is a new problem.  The patient presents today endorsing worsening rash to her lower extremities likely due to ringworm.  Risk factors include the fact that she had a cat but unfortunately passed away 1 month ago.  She has been under a lot of stress because of this.  She has tried hydrocortisone cream only with mild relief.  At this time I would like to do a trial of ketoconazole for 4 weeks.  I also recommend the she changes her bed sheets and deep cleans her home if she has not done so already.  I also prescribed hydroxyzine to relieve her itching.  I will also screen her for " diabetes to rule out immunosuppression.  - ketoconazole (NIZORAL) 2 % Cream; Apply thin layer affected areas twice daily for 4 weeks.  Dispense: 30 g; Refill: 3  - hydrOXYzine HCl (ATARAX) 25 MG Tab; Take 1 Tab by mouth 3 times a day as needed for Itching.  Dispense: 30 Tab; Refill: 0  - Comp Metabolic Panel; Future  - HEMOGLOBIN A1C; Future    2. Hyponatremia  3. Abnormal finding of blood chemistry, unspecified   This is a chronic condition.  The patient has a history of hyponatremia without symptomatology.  Most recent sodium level was in June 2020 at 131.  She has pending left to further look into this.  It looks like she has been deficient on sodium intake.  I have reinforced the need for her to increase her sodium intake.  - Comp Metabolic Panel; Future  - HEMOGLOBIN A1C; Future      Return if symptoms worsen or fail to improve.    Please note that this dictation was created using voice recognition software. I have made every reasonable attempt to correct obvious errors, but I expect that there are errors of grammar and possibly content that I did not discover before finalizing the note.

## 2020-08-24 NOTE — ASSESSMENT & PLAN NOTE
This is a chronic issue  Was found to have low Na with low chloride over the year  She is asymptomatic  She does keep a low sodium diet  She is not on any medicines   Pending labs

## 2020-08-24 NOTE — ASSESSMENT & PLAN NOTE
This is a new condition.  Symptom onset: The patient has had a rash that is very itchy over the past 2 months that is getting worse. It is erupting and progressing to different areas of her body. Now she has similar rash to her upper inner thighs.   Current symptoms: Circular, well defined rash that is erythematous   Since onset symptoms are: Unchanged  Modifying factors: Had a cat but passed away 1 month ago.  Treatments tried: Tried hydrocortisone cream  Associated symptoms: Denies any

## 2020-08-25 ENCOUNTER — HOSPITAL ENCOUNTER (OUTPATIENT)
Dept: LAB | Facility: MEDICAL CENTER | Age: 70
End: 2020-08-25
Attending: FAMILY MEDICINE
Payer: MEDICARE

## 2020-08-25 DIAGNOSIS — E87.1 HYPONATREMIA: ICD-10-CM

## 2020-08-25 DIAGNOSIS — R21 RASH: ICD-10-CM

## 2020-08-25 DIAGNOSIS — R79.9 ABNORMAL FINDING OF BLOOD CHEMISTRY, UNSPECIFIED: ICD-10-CM

## 2020-08-25 LAB
ALBUMIN SERPL BCP-MCNC: 4.6 G/DL (ref 3.2–4.9)
ALBUMIN/GLOB SERPL: 1.6 G/DL
ALP SERPL-CCNC: 55 U/L (ref 30–99)
ALT SERPL-CCNC: 16 U/L (ref 2–50)
ANION GAP SERPL CALC-SCNC: 10 MMOL/L (ref 7–16)
AST SERPL-CCNC: 18 U/L (ref 12–45)
BILIRUB SERPL-MCNC: 0.6 MG/DL (ref 0.1–1.5)
BUN SERPL-MCNC: 5 MG/DL (ref 8–22)
CALCIUM SERPL-MCNC: 9.5 MG/DL (ref 8.5–10.5)
CHLORIDE SERPL-SCNC: 90 MMOL/L (ref 96–112)
CO2 SERPL-SCNC: 29 MMOL/L (ref 20–33)
CREAT SERPL-MCNC: 0.42 MG/DL (ref 0.5–1.4)
EST. AVERAGE GLUCOSE BLD GHB EST-MCNC: 111 MG/DL
GLOBULIN SER CALC-MCNC: 2.8 G/DL (ref 1.9–3.5)
GLUCOSE SERPL-MCNC: 99 MG/DL (ref 65–99)
HBA1C MFR BLD: 5.5 % (ref 0–5.6)
OSMOLALITY SERPL: 273 MOSM/KG H2O (ref 278–298)
OSMOLALITY UR: 273 MOSM/KG H2O (ref 300–900)
POTASSIUM SERPL-SCNC: 4.3 MMOL/L (ref 3.6–5.5)
PROT SERPL-MCNC: 7.4 G/DL (ref 6–8.2)
SODIUM SERPL-SCNC: 129 MMOL/L (ref 135–145)
SODIUM UR-SCNC: 21 MMOL/L

## 2020-08-25 PROCEDURE — 83036 HEMOGLOBIN GLYCOSYLATED A1C: CPT | Mod: GA

## 2020-08-25 PROCEDURE — 83930 ASSAY OF BLOOD OSMOLALITY: CPT

## 2020-08-25 PROCEDURE — 83935 ASSAY OF URINE OSMOLALITY: CPT

## 2020-08-25 PROCEDURE — 36415 COLL VENOUS BLD VENIPUNCTURE: CPT

## 2020-08-25 PROCEDURE — 84300 ASSAY OF URINE SODIUM: CPT

## 2020-08-25 PROCEDURE — 80053 COMPREHEN METABOLIC PANEL: CPT

## 2020-08-28 ENCOUNTER — TELEPHONE (OUTPATIENT)
Dept: MEDICAL GROUP | Facility: PHYSICIAN GROUP | Age: 70
End: 2020-08-28

## 2020-08-28 DIAGNOSIS — R21 RASH: ICD-10-CM

## 2020-08-28 RX ORDER — KETOCONAZOLE 20 MG/G
CREAM TOPICAL
Qty: 60 G | Refills: 3 | Status: SHIPPED | OUTPATIENT
Start: 2020-08-28 | End: 2021-01-25

## 2020-08-28 NOTE — TELEPHONE ENCOUNTER
Patient stopped by at the  requesting for more cream. She said she only received a little tube of cream and it is not enough. Patient is aware she has refills but its not going to last. Please Advise.LM

## 2020-08-31 NOTE — TELEPHONE ENCOUNTER
Phone Number Called: [phone] (home)       Call outcome: Left detailed message for patient. Informed to call back with any additional questions.    Message: Medication at pharmacy. LM

## 2021-01-15 DIAGNOSIS — Z23 NEED FOR VACCINATION: ICD-10-CM

## 2021-01-19 ENCOUNTER — TELEPHONE (OUTPATIENT)
Dept: MEDICAL GROUP | Facility: PHYSICIAN GROUP | Age: 71
End: 2021-01-19

## 2021-01-19 NOTE — TELEPHONE ENCOUNTER
ESTABLISHED PATIENT PRE-VISIT PLANNING     Patient was NOT contacted to complete PVP.     Note: Patient will not be contacted if there is no indication to call.     1.  Reviewed notes from the last few office visits within the medical group: Yes    2.  If any orders were placed at last visit or intended to be done for this visit (i.e. 6 mos follow-up), do we have Results/Consult Notes?         •  Labs - Labs ordered, completed on 08/25/20 and results are in chart.  Note: If patient appointment is for lab review and patient did not complete labs, check with provider if OK to reschedule patient until labs completed.       •  Imaging - Imaging was not ordered at last office visit.       •  Referrals - No referrals were ordered at last office visit.    3. Is this appointment scheduled as a Hospital Follow-Up? No    4.  Immunizations were updated in Epic using Reconcile Outside Information activity? No    5.  Patient is due for the following Health Maintenance Topics:   Health Maintenance Due   Topic Date Due   • HEPATITIS C SCREENING  1950   • IMM ZOSTER VACCINES (1 of 2) 08/23/1998   • MAMMOGRAM  01/08/2019       - Patient plans to schedule appointment for Annual Wellness Visit (AWV) and Mammogram.    6.  AHA (Pulse8) form printed for Provider? N/A

## 2021-01-25 ENCOUNTER — OFFICE VISIT (OUTPATIENT)
Dept: MEDICAL GROUP | Facility: PHYSICIAN GROUP | Age: 71
End: 2021-01-25
Payer: MEDICARE

## 2021-01-25 ENCOUNTER — HOSPITAL ENCOUNTER (OUTPATIENT)
Dept: LAB | Facility: MEDICAL CENTER | Age: 71
End: 2021-01-25
Attending: FAMILY MEDICINE
Payer: MEDICARE

## 2021-01-25 VITALS
BODY MASS INDEX: 21.46 KG/M2 | HEIGHT: 62 IN | TEMPERATURE: 98.1 F | OXYGEN SATURATION: 97 % | RESPIRATION RATE: 16 BRPM | DIASTOLIC BLOOD PRESSURE: 64 MMHG | SYSTOLIC BLOOD PRESSURE: 122 MMHG | HEART RATE: 98 BPM | WEIGHT: 116.6 LBS

## 2021-01-25 DIAGNOSIS — K21.9 GASTROESOPHAGEAL REFLUX DISEASE WITHOUT ESOPHAGITIS: ICD-10-CM

## 2021-01-25 DIAGNOSIS — E78.2 MIXED HYPERLIPIDEMIA: ICD-10-CM

## 2021-01-25 DIAGNOSIS — E87.1 HYPONATREMIA: ICD-10-CM

## 2021-01-25 PROCEDURE — 83013 H PYLORI (C-13) BREATH: CPT

## 2021-01-25 PROCEDURE — 99214 OFFICE O/P EST MOD 30 MIN: CPT | Performed by: FAMILY MEDICINE

## 2021-01-25 ASSESSMENT — FIBROSIS 4 INDEX: FIB4 SCORE: 1.4

## 2021-01-25 NOTE — ASSESSMENT & PLAN NOTE
This is a chronic issue  The patient is not taking anything for this  She avoids PPI  Would like H Pylori testing

## 2021-01-25 NOTE — ASSESSMENT & PLAN NOTE
This is a chronic issue  The patient has been noted to have hyponatremia of unknown etiology  Last sodium was checked 5 months ago at 129  Has tried to increase her sodium intake   Due for repeat labs

## 2021-01-25 NOTE — PROGRESS NOTES
Subjective:     Chief Complaint   Patient presents with   • Follow-Up       HPI:   Yuly presents today to discuss the following.    Gastroesophageal reflux disease without esophagitis  This is a chronic issue  The patient is not taking anything for this  She avoids PPI  Would like H Pylori testing     Hyponatremia  This is a chronic issue  The patient has been noted to have hyponatremia of unknown etiology  Last sodium was checked 5 months ago at 129  Has tried to increase her sodium intake   Due for repeat labs    Mixed hyperlipidemia  This is a chronic issue  Due for repeat labs      Past Medical History:   Diagnosis Date   • Cervical radiculopathy    • Foot pain, left    • High risk sexual behavior    • Insomnia, psychophysiological    • Menopause    • Tobacco dependence    • UTI (urinary tract infection)        Social History     Tobacco Use   • Smoking status: Current Every Day Smoker     Packs/day: 0.25     Types: Cigarettes   • Smokeless tobacco: Never Used   Substance Use Topics   • Alcohol use: Yes     Alcohol/week: 0.6 oz     Types: 1 Glasses of wine per week   • Drug use: Never       Current Outpatient Medications Ordered in Epic   Medication Sig Dispense Refill   • aspirin (ASPIRIN 81) 81 MG Chew Tab chewable tablet Take 81 mg by mouth every day.     • Omega-3 Fatty Acids (FISH OIL) 1000 MG Cap capsule Take 1,000 mg by mouth 3 times a day, with meals.       No current Epic-ordered facility-administered medications on file.        Allergies:  Patient has no known allergies.    Health Maintenance: Completed    ROS:  Gen: no fevers/chills, no changes in weight  Eyes: no changes in vision  ENT: no sore throat, no hearing loss, no bloody nose  Pulm: no sob, no cough  CV: no chest pain, no palpitations  GI: no nausea/vomiting, no diarrhea  : no dysuria      Objective:     Exam:  /64 (BP Location: Left arm, Patient Position: Sitting, BP Cuff Size: Adult)   Pulse 98   Temp 36.7 °C (98.1 °F) (Temporal)   " Resp 16   Ht 1.575 m (5' 2\")   Wt 52.9 kg (116 lb 9.6 oz)   LMP 01/01/1989   SpO2 97%   BMI 21.33 kg/m²  Body mass index is 21.33 kg/m².    Constitutional: Alert, no distress, well-groomed.  Skin: Warm, dry, good turgor, no rashes in visible areas.  Eye: Equal, round and reactive, conjunctiva clear, lids normal.  ENMT: Lips without lesions, good dentition, moist mucous membranes.  Neck: Trachea midline, no masses, no thyromegaly.  Respiratory: Unlabored respiratory effort, no cough.  MSK: Normal gait, moves all extremities.  Neuro: Grossly non-focal.   Psych: Alert and oriented x3, normal affect and mood.        Assessment & Plan:     70 y.o. female with the following -     1. Gastroesophageal reflux disease without esophagitis  This is a chronic, stable condition.  The patient has a history of acid reflux on PPI as needed.  He has never been tested for H. pylori he would like to proceed with testing today.  Order is in place.  - H. PYLORI, UREA BREATH TEST, ADULT; Future    2. Hyponatremia  This is a chronic, stable condition.  She has a history of hyponatremia of unknown etiology.  I suspect hypovolemic hyponatremia.  I have asked of her to increase her sodium intake.  We will repeat labs today.  I will also order a lipid panel to rule out pseudohyponatremia.  She tested negative to diabetes.  If sodium continues to decline I will refer to endocrinology  - Basic Metabolic Panel; Future  - Lipid Profile; Future    3. Mixed hyperlipidemia  This is a chronic, stable condition.  She has not checked her lipid panel for many years.  She is due for repeat at this time.  - Basic Metabolic Panel; Future  - Lipid Profile; Future      Return in about 3 months (around 4/25/2021).    Please note that this dictation was created using voice recognition software. I have made every reasonable attempt to correct obvious errors, but I expect that there are errors of grammar and possibly content that I did not discover before " finalizing the note.

## 2021-01-27 LAB — UREA BREATH TEST QL: POSITIVE

## 2021-01-28 DIAGNOSIS — A04.8 H. PYLORI INFECTION: ICD-10-CM

## 2021-01-28 RX ORDER — METRONIDAZOLE 500 MG/1
500 TABLET ORAL 3 TIMES DAILY
Qty: 42 TAB | Refills: 0 | Status: SHIPPED | OUTPATIENT
Start: 2021-01-28 | End: 2021-02-11

## 2021-01-28 RX ORDER — CLARITHROMYCIN 500 MG/1
500 TABLET, COATED ORAL 2 TIMES DAILY
Qty: 28 TAB | Refills: 0 | Status: SHIPPED | OUTPATIENT
Start: 2021-01-28 | End: 2021-02-11

## 2021-01-28 RX ORDER — PANTOPRAZOLE SODIUM 40 MG/1
40 TABLET, DELAYED RELEASE ORAL 2 TIMES DAILY
Qty: 28 TAB | Refills: 0 | Status: SHIPPED | OUTPATIENT
Start: 2021-01-28 | End: 2021-02-11

## 2021-02-18 ENCOUNTER — HOSPITAL ENCOUNTER (OUTPATIENT)
Dept: LAB | Facility: MEDICAL CENTER | Age: 71
End: 2021-02-18
Attending: FAMILY MEDICINE
Payer: MEDICARE

## 2021-02-18 DIAGNOSIS — E87.1 HYPONATREMIA: ICD-10-CM

## 2021-02-18 DIAGNOSIS — E78.2 MIXED HYPERLIPIDEMIA: ICD-10-CM

## 2021-02-18 LAB
ANION GAP SERPL CALC-SCNC: 8 MMOL/L (ref 7–16)
BUN SERPL-MCNC: 6 MG/DL (ref 8–22)
CALCIUM SERPL-MCNC: 9.4 MG/DL (ref 8.5–10.5)
CHLORIDE SERPL-SCNC: 93 MMOL/L (ref 96–112)
CHOLEST SERPL-MCNC: 185 MG/DL (ref 100–199)
CO2 SERPL-SCNC: 26 MMOL/L (ref 20–33)
CREAT SERPL-MCNC: 0.33 MG/DL (ref 0.5–1.4)
GLUCOSE SERPL-MCNC: 94 MG/DL (ref 65–99)
HDLC SERPL-MCNC: 37 MG/DL
LDLC SERPL CALC-MCNC: 128 MG/DL
POTASSIUM SERPL-SCNC: 4.8 MMOL/L (ref 3.6–5.5)
SODIUM SERPL-SCNC: 127 MMOL/L (ref 135–145)
TRIGL SERPL-MCNC: 101 MG/DL (ref 0–149)

## 2021-02-18 PROCEDURE — 80061 LIPID PANEL: CPT

## 2021-02-18 PROCEDURE — 80048 BASIC METABOLIC PNL TOTAL CA: CPT

## 2021-02-18 PROCEDURE — 36415 COLL VENOUS BLD VENIPUNCTURE: CPT

## 2021-02-19 DIAGNOSIS — E87.1 HYPONATREMIA: ICD-10-CM

## 2021-03-12 ENCOUNTER — IMMUNIZATION (OUTPATIENT)
Dept: FAMILY PLANNING/WOMEN'S HEALTH CLINIC | Facility: IMMUNIZATION CENTER | Age: 71
End: 2021-03-12
Attending: INTERNAL MEDICINE
Payer: MEDICARE

## 2021-03-12 DIAGNOSIS — Z23 ENCOUNTER FOR VACCINATION: Primary | ICD-10-CM

## 2021-03-12 DIAGNOSIS — Z23 NEED FOR VACCINATION: ICD-10-CM

## 2021-03-12 PROCEDURE — 91301 MODERNA SARS-COV-2 VACCINE: CPT

## 2021-03-12 PROCEDURE — 0011A MODERNA SARS-COV-2 VACCINE: CPT

## 2021-04-10 ENCOUNTER — IMMUNIZATION (OUTPATIENT)
Dept: FAMILY PLANNING/WOMEN'S HEALTH CLINIC | Facility: IMMUNIZATION CENTER | Age: 71
End: 2021-04-10
Attending: INTERNAL MEDICINE
Payer: MEDICARE

## 2021-04-10 DIAGNOSIS — Z23 ENCOUNTER FOR VACCINATION: Primary | ICD-10-CM

## 2021-04-10 PROCEDURE — 0012A MODERNA SARS-COV-2 VACCINE: CPT

## 2021-04-10 PROCEDURE — 91301 MODERNA SARS-COV-2 VACCINE: CPT

## 2021-04-27 ENCOUNTER — OFFICE VISIT (OUTPATIENT)
Dept: MEDICAL GROUP | Facility: PHYSICIAN GROUP | Age: 71
End: 2021-04-27
Payer: MEDICARE

## 2021-04-27 ENCOUNTER — HOSPITAL ENCOUNTER (OUTPATIENT)
Dept: LAB | Facility: MEDICAL CENTER | Age: 71
End: 2021-04-27
Attending: FAMILY MEDICINE
Payer: MEDICARE

## 2021-04-27 VITALS
DIASTOLIC BLOOD PRESSURE: 80 MMHG | HEART RATE: 89 BPM | RESPIRATION RATE: 15 BRPM | BODY MASS INDEX: 21.18 KG/M2 | TEMPERATURE: 98.2 F | SYSTOLIC BLOOD PRESSURE: 130 MMHG | OXYGEN SATURATION: 98 % | WEIGHT: 115.8 LBS

## 2021-04-27 DIAGNOSIS — A04.8 H. PYLORI INFECTION: ICD-10-CM

## 2021-04-27 DIAGNOSIS — E87.1 HYPONATREMIA: ICD-10-CM

## 2021-04-27 DIAGNOSIS — E78.2 MIXED HYPERLIPIDEMIA: ICD-10-CM

## 2021-04-27 LAB
ANION GAP SERPL CALC-SCNC: 8 MMOL/L (ref 7–16)
BUN SERPL-MCNC: 7 MG/DL (ref 8–22)
CALCIUM SERPL-MCNC: 9.6 MG/DL (ref 8.5–10.5)
CHLORIDE SERPL-SCNC: 97 MMOL/L (ref 96–112)
CO2 SERPL-SCNC: 29 MMOL/L (ref 20–33)
CREAT SERPL-MCNC: 0.38 MG/DL (ref 0.5–1.4)
GLUCOSE SERPL-MCNC: 92 MG/DL (ref 65–99)
POTASSIUM SERPL-SCNC: 5 MMOL/L (ref 3.6–5.5)
SODIUM SERPL-SCNC: 134 MMOL/L (ref 135–145)

## 2021-04-27 PROCEDURE — 80048 BASIC METABOLIC PNL TOTAL CA: CPT

## 2021-04-27 PROCEDURE — 83013 H PYLORI (C-13) BREATH: CPT

## 2021-04-27 PROCEDURE — 99214 OFFICE O/P EST MOD 30 MIN: CPT | Performed by: FAMILY MEDICINE

## 2021-04-27 PROCEDURE — 36415 COLL VENOUS BLD VENIPUNCTURE: CPT

## 2021-04-27 RX ORDER — ROSUVASTATIN CALCIUM 10 MG/1
10 TABLET, COATED ORAL EVERY EVENING
Qty: 90 TABLET | Refills: 1 | Status: SHIPPED | OUTPATIENT
Start: 2021-04-27 | End: 2021-07-20

## 2021-04-27 ASSESSMENT — FIBROSIS 4 INDEX: FIB4 SCORE: 1.4

## 2021-04-27 ASSESSMENT — PATIENT HEALTH QUESTIONNAIRE - PHQ9: CLINICAL INTERPRETATION OF PHQ2 SCORE: 0

## 2021-04-27 NOTE — PROGRESS NOTES
Subjective:     Chief Complaint   Patient presents with   • Hyperlipidemia       HPI:   Yuly presents today to discuss the following.    H. pylori infection  New problem  Diagnosed with H Pylori 1/21  Completed treatment for 2 weeks successfully  Her symptoms have improved.     Hyponatremia  Chronic issue  Has been noted to have low sodium last checked in 1/21 at 127. Slightly downtrending.   I referred to endocrinology.   Asymptomatic at this time but sometimes she feels tired.     Mixed hyperlipidemia  Chronic issue  Completed last lipid panel 1/21  The 10-year ASCVD risk score (Carlock SOLE Jr., et al., 2013) is: 18.8%        Past Medical History:   Diagnosis Date   • Cervical radiculopathy    • Foot pain, left    • High risk sexual behavior    • Insomnia, psychophysiological    • Menopause    • Tobacco dependence    • UTI (urinary tract infection)        Current Outpatient Medications Ordered in Epic   Medication Sig Dispense Refill   • rosuvastatin (CRESTOR) 10 MG Tab Take 1 tablet by mouth every evening. 90 tablet 1   • aspirin (ASPIRIN 81) 81 MG Chew Tab chewable tablet Take 81 mg by mouth every day.     • Omega-3 Fatty Acids (FISH OIL) 1000 MG Cap capsule Take 1,000 mg by mouth 3 times a day, with meals.       No current Epic-ordered facility-administered medications on file.       Allergies:  Patient has no known allergies.    Health Maintenance: Completed    ROS:  Gen: no fevers/chills, no changes in weight  Eyes: no changes in vision  Pulm: no sob, no cough  CV: no chest pain, no palpitations  GI: no nausea/vomiting, no diarrhea      Objective:     Exam:  /80 (BP Location: Left arm, Patient Position: Sitting, BP Cuff Size: Adult)   Pulse 89   Temp 36.8 °C (98.2 °F) (Temporal)   Resp 15   Wt 52.5 kg (115 lb 12.8 oz)   LMP 01/01/1989   SpO2 98%   BMI 21.18 kg/m²  Body mass index is 21.18 kg/m².        Constitutional: Alert, no distress, well-groomed.  Skin: Warm, dry, good turgor, no rashes in  visible areas.  Eye: Equal, round and reactive, conjunctiva clear, lids normal.  ENMT: Lips without lesions, good dentition, moist mucous membranes.  Neck: Trachea midline, no masses, no thyromegaly.  Respiratory: Unlabored respiratory effort, no cough.  MSK: Normal gait, moves all extremities.  Neuro: Grossly non-focal.   Psych: Alert and oriented x3, normal affect and mood.        Assessment & Plan:     70 y.o. female with the following -     1. H. pylori infection  This is a new problem.  Diagnosed January 2021.  Completed treatment.  Will recheck to ensure complete resolution.  - H. PYLORI, UREA BREATH TEST, ADULT; Future    2. Hyponatremia  Chronic, worsening condition.  Has had worsening hyponatremia down to 127 most recently in January 2021.  Has been referred to endocrinology.  Establishing appointment July 2021.  I will recheck BMP today.  - Basic Metabolic Panel; Future    3. Mixed hyperlipidemia  Chronic, stable condition.  ASCVD score at 18.8%.  Eligible for statin therapy.  - rosuvastatin (CRESTOR) 10 MG Tab; Take 1 tablet by mouth every evening.  Dispense: 90 tablet; Refill: 1      Return in about 6 months (around 10/27/2021).    Please note that this dictation was created using voice recognition software. I have made every reasonable attempt to correct obvious errors, but I expect that there are errors of grammar and possibly content that I did not discover before finalizing the note.

## 2021-04-27 NOTE — ASSESSMENT & PLAN NOTE
Chronic issue  Completed last lipid panel 1/21  The 10-year ASCVD risk score (Aline WHIPPLE Jr., et al., 2013) is: 18.8%

## 2021-04-27 NOTE — ASSESSMENT & PLAN NOTE
New problem  Diagnosed with H Pylori 1/21  Completed treatment for 2 weeks successfully  Her symptoms have improved.

## 2021-04-27 NOTE — ASSESSMENT & PLAN NOTE
Chronic issue  Has been noted to have low sodium last checked in 1/21 at 127. Slightly downtrending.   I referred to endocrinology.   Asymptomatic at this time but sometimes she feels tired.

## 2021-04-29 DIAGNOSIS — A04.8 H. PYLORI INFECTION: ICD-10-CM

## 2021-04-29 LAB — UREA BREATH TEST QL: POSITIVE

## 2021-04-29 RX ORDER — BISMUTH SUBSALICYLATE 262 MG/1
524 TABLET, CHEWABLE ORAL
Qty: 112 TABLET | Refills: 0 | Status: SHIPPED | OUTPATIENT
Start: 2021-04-29 | End: 2021-05-13

## 2021-04-29 RX ORDER — TETRACYCLINE HYDROCHLORIDE 500 MG/1
500 CAPSULE ORAL 4 TIMES DAILY
Qty: 56 CAPSULE | Refills: 0 | Status: SHIPPED | OUTPATIENT
Start: 2021-04-29 | End: 2021-05-13

## 2021-04-29 RX ORDER — PANTOPRAZOLE SODIUM 40 MG/1
40 TABLET, DELAYED RELEASE ORAL 2 TIMES DAILY
Qty: 28 TABLET | Refills: 0 | Status: SHIPPED | OUTPATIENT
Start: 2021-04-29 | End: 2021-05-13

## 2021-04-29 RX ORDER — METRONIDAZOLE 250 MG/1
250 TABLET ORAL 4 TIMES DAILY
Qty: 56 TABLET | Refills: 0 | Status: SHIPPED | OUTPATIENT
Start: 2021-04-29 | End: 2021-05-13

## 2021-07-20 ENCOUNTER — OFFICE VISIT (OUTPATIENT)
Dept: ENDOCRINOLOGY | Facility: MEDICAL CENTER | Age: 71
End: 2021-07-20
Attending: INTERNAL MEDICINE
Payer: MEDICARE

## 2021-07-20 VITALS
SYSTOLIC BLOOD PRESSURE: 124 MMHG | WEIGHT: 115.6 LBS | DIASTOLIC BLOOD PRESSURE: 74 MMHG | HEART RATE: 82 BPM | HEIGHT: 62 IN | OXYGEN SATURATION: 96 % | BODY MASS INDEX: 21.27 KG/M2

## 2021-07-20 DIAGNOSIS — E87.1 HYPONATREMIA: ICD-10-CM

## 2021-07-20 DIAGNOSIS — E22.2 SIADH (SYNDROME OF INAPPROPRIATE ADH PRODUCTION) (HCC): ICD-10-CM

## 2021-07-20 DIAGNOSIS — E78.5 DYSLIPIDEMIA: ICD-10-CM

## 2021-07-20 DIAGNOSIS — F17.200 CURRENT EVERY DAY SMOKER: ICD-10-CM

## 2021-07-20 PROCEDURE — 99211 OFF/OP EST MAY X REQ PHY/QHP: CPT | Performed by: INTERNAL MEDICINE

## 2021-07-20 PROCEDURE — 99204 OFFICE O/P NEW MOD 45 MIN: CPT | Performed by: INTERNAL MEDICINE

## 2021-07-20 ASSESSMENT — FIBROSIS 4 INDEX: FIB4 SCORE: 1.4

## 2021-07-20 NOTE — PROGRESS NOTES
Chief Complaint: Consult requested by Darwin Paulson M.D. for evaluation of hyponatremia    HPI:     Yuly Gotti is a 70 y.o. female with history of chronic hyponatremia since 2017.    She has been asymptomatic    Comorbid issues include osteoporosis and hyperlipidemia and GERD and H. pylori positivity  Her primary care physician recently prescribed her rosuvastatin but she admits to not taking it for unclear reasons    She denies hydrochlorothiazide use  She denies use of illicit substances such as ecstasy  She denies history of chronic kidney disease, cirrhosis and congestive heart failure    She admits that she is from [place] and she does not like eating a lot of salt  She claims to drink 3 L of water a day    She denies headaches   She denies falls and fractures  She denies vomiting  She denies frequent diarrhea      Labs by his primary care on 8/25/2020 showed a sodium of 129 with a serum osmolarity 273 glucose of 99 serum creatinine of 0.42 potassium 4.3 chloride of 90 BUN of 5 urine sodium is low at 21 urine osmolarity is 273    TSH levels and ACTH and cortisol levels have not been checked            Patient's medications, allergies, and social histories were reviewed and updated as appropriate.      ROS:     CONS:     No fever, no chills, no weight loss, no fatigue   EYES:      No diplopia, no blurry vision, no redness of eyes, no swelling of eyelids   ENT:    No hearing loss, No ear pain, No sore throat, no dysphagia, no neck swelling   CV:     No chest pain, no palpitations, no claudication, no orthopnea, no PND   PULM:    No SOB, reports intermittent nonproductive cough, no hemoptysis, no wheezing    GI:   No nausea, no vomiting, no diarrhea, no constipation, no bloody stools   :  Passing urine well, no dysuria, no hematuria   ENDO:   No polyuria, no polydipsia, no heat intolerance, no cold intolerance   NEURO: No headaches, no dizziness, no convulsions, no tremors   MUSC:  No joint swellings, no  arthralgias, no myalgias, no weakness   SKIN:   No rash, no ulcers, no dry skin   PSYCH:   No depression, no anxiety, no difficulty sleeping       Past Medical History:  Patient Active Problem List    Diagnosis Date Noted   • H. pylori infection 04/27/2021   • Mixed hyperlipidemia 01/25/2021   • Rash 08/24/2020   • Hyponatremia 06/25/2020   • Age-related osteoporosis without current pathological fracture 03/24/2020   • Urticaria 02/10/2017   • Chronic cervical radiculopathy 11/16/2016   • Gastroesophageal reflux disease without esophagitis 11/16/2016   • Insomnia 05/10/2016   • Tobacco dependence 05/10/2016       Past Surgical History:  Past Surgical History:   Procedure Laterality Date   • ARTERY EXPLORATION OR REPAIR Left 4/3/2019    Procedure: RADIAL ARTERY REPAIR;  Surgeon: Wally Colbert M.D.;  Location: SURGERY [facility];  Service: General   • TENDON REPAIR Left 4/3/2019    Procedure: REPAIR, TENDON;  Surgeon: Federico Saenz M.D.;  Location: SURGERY [facility];  Service: Orthopedics        Allergies:  Patient has no known allergies.     Current Medications:    Current Outpatient Medications:   •  aspirin (ASPIRIN 81) 81 MG Chew Tab chewable tablet, Take 81 mg by mouth every day., Disp: , Rfl:   •  Omega-3 Fatty Acids (FISH OIL) 1000 MG Cap capsule, Take 1,000 mg by mouth 3 times a day, with meals., Disp: , Rfl:     Social History:  Social History     Socioeconomic History   • Marital status: Single     Spouse name: Not on file   • Number of children: Not on file   • Years of education: Not on file   • Highest education level: Not on file   Occupational History   • Not on file   Tobacco Use   • Smoking status: Current Every Day Smoker     Packs/day: 0.25     Types: Cigarettes   • Smokeless tobacco: Never Used   Vaping Use   • Vaping Use: Never used   Substance and Sexual Activity   • Alcohol use: Yes     Alcohol/week: 0.6 oz     Types: 1 Glasses of wine per week   • Drug use: Never   • Sexual  "activity: Not Currently   Other Topics Concern   • Not on file   Social History Narrative   • Not on file     Social Determinants of Health     Financial Resource Strain:    • Difficulty of Paying Living Expenses:    Food Insecurity:    • Worried About Running Out of Food in the Last Year:    • Ran Out of Food in the Last Year:    Transportation Needs:    • Lack of Transportation (Medical):    • Lack of Transportation (Non-Medical):    Physical Activity:    • Days of Exercise per Week:    • Minutes of Exercise per Session:    Stress:    • Feeling of Stress :    Social Connections:    • Frequency of Communication with Friends and Family:    • Frequency of Social Gatherings with Friends and Family:    • Attends [employer] Services:    • Active Member of Clubs or Organizations:    • Attends Club or Organization Meetings:    • Marital Status:    Intimate Partner Violence:    • Fear of Current or Ex-Partner:    • Emotionally Abused:    • Physically Abused:    • Sexually Abused:         Family History:   Family History   Problem Relation Age of Onset   • Cancer Sister          PHYSICAL EXAM:   Vital signs: /74 (BP Location: Left arm, Patient Position: Sitting, BP Cuff Size: Adult)   Pulse 82   Ht 1.575 m (5' 2\")   Wt 52.4 kg (115 lb 9.6 oz)   LMP 01/01/1989   SpO2 96%   BMI 21.14 kg/m²   GENERAL: Well-developed, well-nourished  in no apparent distress.   EYE: No ocular and eyelid asymmetry, Anicteric sclerae,  PERRL, No exophthalmos or lidlag  HENT: Hearing grossly intact, Normocephalic, atraumatic. Pink, moist mucous membranes, No exudate  NECK: Supple. Trachea midline. thyroid is normal in size without nodules or tenderness  CARDIOVASCULAR: Regular rate and rhythm. No murmurs, rubs, or gallops.   LUNGS: Clear to auscultation bilaterally   ABDOMEN: Soft, nontender with positive bowel sounds.   EXTREMITIES: No clubbing, cyanosis, or edema.   NEUROLOGICAL: Cranial nerves II-XII are grossly intact   Symmetric " reflexes at the patella no proximal muscle weakness, No visible tremor with both outstretched hands  LYMPH: No cervical, supraclavicular,  adenopathy palpated.   SKIN: No rashes, lesions. Turgor is normal.    Labs:  Lab Results   Component Value Date/Time    WBC 6.0 06/23/2020 09:38 AM    RBC 4.36 06/23/2020 09:38 AM    HEMOGLOBIN 13.8 06/23/2020 09:38 AM    MCV 92.0 06/23/2020 09:38 AM    MCH 31.7 06/23/2020 09:38 AM    MCHC 34.4 06/23/2020 09:38 AM    RDW 45.3 06/23/2020 09:38 AM    MPV 9.8 06/23/2020 09:38 AM       Lab Results   Component Value Date/Time    SODIUM 134 (L) 04/27/2021 01:43 PM    POTASSIUM 5.0 04/27/2021 01:43 PM    CHLORIDE 97 04/27/2021 01:43 PM    CO2 29 04/27/2021 01:43 PM    ANION 8.0 04/27/2021 01:43 PM    GLUCOSE 92 04/27/2021 01:43 PM    BUN 7 (L) 04/27/2021 01:43 PM    CREATININE 0.38 (L) 04/27/2021 01:43 PM    CALCIUM 9.6 04/27/2021 01:43 PM    ASTSGOT 18 08/25/2020 09:15 AM    ALTSGPT 16 08/25/2020 09:15 AM    TBILIRUBIN 0.6 08/25/2020 09:15 AM    ALBUMIN 4.6 08/25/2020 09:15 AM    TOTPROTEIN 7.4 08/25/2020 09:15 AM    GLOBULIN 2.8 08/25/2020 09:15 AM    AGRATIO 1.6 08/25/2020 09:15 AM       Lab Results   Component Value Date/Time    TSHULTRASEN 1.960 08/31/2018 0901     No results found for: FREET4  No results found for: FREET3  No results found for: THYSTIMIG      Imaging:      ASSESSMENT/PLAN:     1. Hyponatremia  Unstable  Recommend additional work-up to rule out hypothyroidism and adrenal insufficiency or Thornton's disease  Her urinary sodium is low which is not compatible with SIADH as in typical SIADH the urine sodium is high  Therefore if her urine sodium is persistently low then her hypoosmolar hyponatremia may be secondary to poor solute intake because she does not appear to be hypovolemic  I am going to have her repeat some labs and I will update her  I will see her again in 3 to 5 months    2. Current every day smoker  Because she has a history of smoking for the past 50  years  And hyponatremia going to schedule her for a chest x-ray    3. SIADH (syndrome of inappropriate ADH production) (HCC)  This is one of my differentials for her hyponatremia   However this is a diagnosis of exclusion we need to rule hypothyroidism and adrenal insufficiency  Secondly with the patient's urine sodium being low this is not compatible with SIADH  I strongly suspect her hyponatremia is from low solute intake    4. Dyslipidemia  Unstable  I recommend that she take the rosuvastatin that was prescribed by her primary care physician because her 10-year cardiovascular risk is greater than 10%      Return in about 3 months (around 10/20/2021).      Thank you kindly for allowing me to participate in the thyroid care plan for this patient.    Constantin Romero MD, [place], [facility]  07/20/21    CC:   Darwin Paulson M.D.

## 2021-07-21 ENCOUNTER — HOSPITAL ENCOUNTER (OUTPATIENT)
Dept: LAB | Facility: MEDICAL CENTER | Age: 71
End: 2021-07-21
Attending: INTERNAL MEDICINE
Payer: MEDICARE

## 2021-07-21 DIAGNOSIS — E22.2 SIADH (SYNDROME OF INAPPROPRIATE ADH PRODUCTION) (HCC): ICD-10-CM

## 2021-07-21 DIAGNOSIS — F17.200 CURRENT EVERY DAY SMOKER: ICD-10-CM

## 2021-07-21 DIAGNOSIS — E87.1 HYPONATREMIA: ICD-10-CM

## 2021-07-21 LAB
ALBUMIN SERPL BCP-MCNC: 4.3 G/DL (ref 3.2–4.9)
ALBUMIN/GLOB SERPL: 1.4 G/DL
ALP SERPL-CCNC: 53 U/L (ref 30–99)
ALT SERPL-CCNC: 14 U/L (ref 2–50)
ANION GAP SERPL CALC-SCNC: 11 MMOL/L (ref 7–16)
AST SERPL-CCNC: 22 U/L (ref 12–45)
BASOPHILS # BLD AUTO: 0.9 % (ref 0–1.8)
BASOPHILS # BLD: 0.05 K/UL (ref 0–0.12)
BILIRUB SERPL-MCNC: 0.4 MG/DL (ref 0.1–1.5)
BUN SERPL-MCNC: 9 MG/DL (ref 8–22)
CALCIUM SERPL-MCNC: 9 MG/DL (ref 8.5–10.5)
CHLORIDE SERPL-SCNC: 93 MMOL/L (ref 96–112)
CO2 SERPL-SCNC: 24 MMOL/L (ref 20–33)
CORTIS SERPL-MCNC: 12.2 UG/DL (ref 0–23)
CREAT SERPL-MCNC: 0.4 MG/DL (ref 0.5–1.4)
EOSINOPHIL # BLD AUTO: 0.17 K/UL (ref 0–0.51)
EOSINOPHIL NFR BLD: 3.2 % (ref 0–6.9)
ERYTHROCYTE [DISTWIDTH] IN BLOOD BY AUTOMATED COUNT: 48.4 FL (ref 35.9–50)
GLOBULIN SER CALC-MCNC: 3.1 G/DL (ref 1.9–3.5)
GLUCOSE SERPL-MCNC: 90 MG/DL (ref 65–99)
HCT VFR BLD AUTO: 40.3 % (ref 37–47)
HGB BLD-MCNC: 13.1 G/DL (ref 12–16)
IMM GRANULOCYTES # BLD AUTO: 0.01 K/UL (ref 0–0.11)
IMM GRANULOCYTES NFR BLD AUTO: 0.2 % (ref 0–0.9)
LYMPHOCYTES # BLD AUTO: 1.22 K/UL (ref 1–4.8)
LYMPHOCYTES NFR BLD: 22.9 % (ref 22–41)
MCH RBC QN AUTO: 29.8 PG (ref 27–33)
MCHC RBC AUTO-ENTMCNC: 32.5 G/DL (ref 33.6–35)
MCV RBC AUTO: 91.6 FL (ref 81.4–97.8)
MONOCYTES # BLD AUTO: 0.56 K/UL (ref 0–0.85)
MONOCYTES NFR BLD AUTO: 10.5 % (ref 0–13.4)
NEUTROPHILS # BLD AUTO: 3.32 K/UL (ref 2–7.15)
NEUTROPHILS NFR BLD: 62.3 % (ref 44–72)
NRBC # BLD AUTO: 0 K/UL
NRBC BLD-RTO: 0 /100 WBC
OSMOLALITY SERPL: 271 MOSM/KG H2O (ref 278–298)
PLATELET # BLD AUTO: 259 K/UL (ref 164–446)
PMV BLD AUTO: 11 FL (ref 9–12.9)
POTASSIUM SERPL-SCNC: 4.3 MMOL/L (ref 3.6–5.5)
PROT SERPL-MCNC: 7.4 G/DL (ref 6–8.2)
RBC # BLD AUTO: 4.4 M/UL (ref 4.2–5.4)
SODIUM SERPL-SCNC: 128 MMOL/L (ref 135–145)
SODIUM UR-SCNC: 28 MMOL/L
T4 FREE SERPL-MCNC: 1.16 NG/DL (ref 0.93–1.7)
TSH SERPL DL<=0.005 MIU/L-ACNC: 2.14 UIU/ML (ref 0.38–5.33)
URATE SERPL-MCNC: 4.9 MG/DL (ref 1.9–8.2)
WBC # BLD AUTO: 5.3 K/UL (ref 4.8–10.8)

## 2021-07-21 PROCEDURE — 80053 COMPREHEN METABOLIC PANEL: CPT

## 2021-07-21 PROCEDURE — 83516 IMMUNOASSAY NONANTIBODY: CPT

## 2021-07-21 PROCEDURE — 84443 ASSAY THYROID STIM HORMONE: CPT

## 2021-07-21 PROCEDURE — 84300 ASSAY OF URINE SODIUM: CPT

## 2021-07-21 PROCEDURE — 36415 COLL VENOUS BLD VENIPUNCTURE: CPT

## 2021-07-21 PROCEDURE — 84550 ASSAY OF BLOOD/URIC ACID: CPT

## 2021-07-21 PROCEDURE — 86376 MICROSOMAL ANTIBODY EACH: CPT

## 2021-07-21 PROCEDURE — 83930 ASSAY OF BLOOD OSMOLALITY: CPT

## 2021-07-21 PROCEDURE — 85025 COMPLETE CBC W/AUTO DIFF WBC: CPT

## 2021-07-21 PROCEDURE — 84439 ASSAY OF FREE THYROXINE: CPT

## 2021-07-21 PROCEDURE — 82533 TOTAL CORTISOL: CPT

## 2021-07-21 PROCEDURE — 82024 ASSAY OF ACTH: CPT

## 2021-07-21 PROCEDURE — 83935 ASSAY OF URINE OSMOLALITY: CPT

## 2021-07-22 DIAGNOSIS — E22.2 SIADH (SYNDROME OF INAPPROPRIATE ADH PRODUCTION) (HCC): ICD-10-CM

## 2021-07-22 DIAGNOSIS — E87.1 HYPONATREMIA: ICD-10-CM

## 2021-07-22 LAB — OSMOLALITY UR: 167 MOSM/KG H2O (ref 300–900)

## 2021-07-23 LAB — ACTH PLAS-MCNC: 24.8 PG/ML (ref 7.2–63.3)

## 2021-07-24 ENCOUNTER — HOSPITAL ENCOUNTER (OUTPATIENT)
Dept: RADIOLOGY | Facility: MEDICAL CENTER | Age: 71
End: 2021-07-24
Attending: INTERNAL MEDICINE
Payer: MEDICARE

## 2021-07-24 DIAGNOSIS — E87.1 HYPONATREMIA: ICD-10-CM

## 2021-07-24 DIAGNOSIS — E22.2 SIADH (SYNDROME OF INAPPROPRIATE ADH PRODUCTION) (HCC): ICD-10-CM

## 2021-07-24 DIAGNOSIS — F17.200 CURRENT EVERY DAY SMOKER: ICD-10-CM

## 2021-07-24 LAB
TEST NAME 95000: NORMAL
THYROPEROXIDASE AB SERPL-ACNC: 357.7 IU/ML (ref 0–9)

## 2021-07-24 PROCEDURE — 71046 X-RAY EXAM CHEST 2 VIEWS: CPT

## 2021-09-15 DIAGNOSIS — Z11.52 ENCOUNTER FOR SCREENING FOR COVID-19: ICD-10-CM

## 2021-09-15 DIAGNOSIS — E78.2 MIXED HYPERLIPIDEMIA: ICD-10-CM

## 2021-09-15 NOTE — TELEPHONE ENCOUNTER
Patient LVM stating she requires a Covid test prior to traveling to [place] and is asking if PCP can order?

## 2021-09-16 RX ORDER — ROSUVASTATIN CALCIUM 10 MG/1
10 TABLET, COATED ORAL EVERY EVENING
Qty: 90 TABLET | Refills: 3 | Status: SHIPPED | OUTPATIENT
Start: 2021-09-16 | End: 2022-09-19

## 2021-10-28 ENCOUNTER — OFFICE VISIT (OUTPATIENT)
Dept: MEDICAL GROUP | Facility: PHYSICIAN GROUP | Age: 71
End: 2021-10-28
Payer: MEDICARE

## 2021-10-28 VITALS
SYSTOLIC BLOOD PRESSURE: 128 MMHG | HEART RATE: 83 BPM | WEIGHT: 117.6 LBS | OXYGEN SATURATION: 98 % | DIASTOLIC BLOOD PRESSURE: 78 MMHG | TEMPERATURE: 98.1 F | HEIGHT: 62 IN | BODY MASS INDEX: 21.64 KG/M2

## 2021-10-28 DIAGNOSIS — E87.1 HYPONATREMIA: ICD-10-CM

## 2021-10-28 DIAGNOSIS — E78.2 MIXED HYPERLIPIDEMIA: ICD-10-CM

## 2021-10-28 DIAGNOSIS — F17.200 TOBACCO DEPENDENCE: ICD-10-CM

## 2021-10-28 DIAGNOSIS — R76.8 ANTI-TPO ANTIBODIES PRESENT: ICD-10-CM

## 2021-10-28 PROCEDURE — 99214 OFFICE O/P EST MOD 30 MIN: CPT | Performed by: FAMILY MEDICINE

## 2021-10-28 ASSESSMENT — FIBROSIS 4 INDEX: FIB4 SCORE: 1.66

## 2021-10-28 NOTE — PROGRESS NOTES
"Subjective:     Chief Complaint   Patient presents with   • Thyroid Follow-up     thyroid results   • Results     low sodium       HPI:   Yuly presents today to discuss the following.    Hyponatremia  Chronic issue.  Currently being worked up by Dr. Romero from endocrinology.  Likely low solute intake.  Pending repeat blood testing with next follow-up February 2022. SIADH still in the diff dx though unlikely   She is trying to increase her salt    Mixed hyperlipidemia  Chronic condition.  I have made the recommendation for Crestor 10 mg daily.    Anti-TPO antibodies present  Chronic issue  Noted to have TPO abx  Normal TSH  Awaiting endo recommendations     Tobacco dependence  Chronic issue  Dx chest negative for abnormalities  Currently smokes about 4 cigs / day        Past Medical History:   Diagnosis Date   • Cervical radiculopathy    • Foot pain, left    • High risk sexual behavior    • Insomnia, psychophysiological    • Menopause    • Tobacco dependence    • UTI (urinary tract infection)        Current Outpatient Medications Ordered in Epic   Medication Sig Dispense Refill   • rosuvastatin (CRESTOR) 10 MG Tab Take 1 Tablet by mouth every evening. 90 Tablet 3   • aspirin (ASPIRIN 81) 81 MG Chew Tab chewable tablet Take 81 mg by mouth every day.     • Omega-3 Fatty Acids (FISH OIL) 1000 MG Cap capsule Take 1,000 mg by mouth 3 times a day, with meals.       No current Epic-ordered facility-administered medications on file.       Allergies:  Patient has no known allergies.    Health Maintenance: Completed    ROS:  Gen: no fevers/chills, no changes in weight  Eyes: no changes in vision  Pulm: no sob, no cough  CV: no chest pain, no palpitations  GI: no nausea/vomiting, no diarrhea      Objective:     Exam:  /78 (BP Location: Left arm, Patient Position: Sitting, BP Cuff Size: Adult)   Pulse 83   Temp 36.7 °C (98.1 °F) (Temporal)   Ht 1.575 m (5' 2\")   Wt 53.3 kg (117 lb 9.6 oz)   LMP 01/01/1989   SpO2 " 98%   BMI 21.51 kg/m²  Body mass index is 21.51 kg/m².          Constitutional: Alert, no distress, well-groomed.  Skin: Warm, dry, good turgor, no rashes in visible areas.  Eye: Equal, round and reactive, conjunctiva clear, lids normal.  ENMT: Lips without lesions, good dentition, moist mucous membranes.  Neck: Trachea midline, no masses, no thyromegaly.  Respiratory: Unlabored respiratory effort, no cough.  MSK: Normal gait, moves all extremities.  Neuro: Grossly non-focal.   Psych: Alert and oriented x3, normal affect and mood.        Assessment & Plan:     71 y.o. female with the following -     1. Hyponatremia  Chronic, stable condition.  Following up with endocrinology.  In the differential SIADH is considered versus low solute intake.  Having said that urine sodium concentration is on the lower end, an unexpected finding for SIADH.  The patient has been trying to increase her solute intake.  She is to follow-up with endocrinology in February.    2. Mixed hyperlipidemia  Chronic, stable condition.  Continue with statin therapy.    3. Anti-TPO antibodies present  Chronic issue.  She is noted to have TPO antibodies present.  Normal TSH.  Currently awaiting endocrinology recommendations.    4. Tobacco dependence  Chronic, stable condition.  Smokes about 4 cigarettes/day.  Chest x-ray is negative for any nodules.  Smoking cessation counseling provided today.  I did offer a trial Wellbutrin but she would like to defer.      Return in about 6 months (around 4/28/2022).    Please note that this dictation was created using voice recognition software. I have made every reasonable attempt to correct obvious errors, but I expect that there are errors of grammar and possibly content that I did not discover before finalizing the note.

## 2021-10-28 NOTE — ASSESSMENT & PLAN NOTE
Chronic issue.  Currently being worked up by Dr. Romero from endocrinology.  Likely low solute intake.  Pending repeat blood testing with next follow-up February 2022. SIADH still in the diff dx though unlikely   She is trying to increase her salt

## 2022-05-03 ENCOUNTER — HOSPITAL ENCOUNTER (OUTPATIENT)
Dept: LAB | Facility: MEDICAL CENTER | Age: 72
End: 2022-05-03
Attending: FAMILY MEDICINE
Payer: MEDICARE

## 2022-05-03 ENCOUNTER — OFFICE VISIT (OUTPATIENT)
Dept: MEDICAL GROUP | Facility: PHYSICIAN GROUP | Age: 72
End: 2022-05-03
Payer: MEDICARE

## 2022-05-03 VITALS
OXYGEN SATURATION: 93 % | SYSTOLIC BLOOD PRESSURE: 134 MMHG | HEIGHT: 62 IN | WEIGHT: 114 LBS | HEART RATE: 90 BPM | DIASTOLIC BLOOD PRESSURE: 72 MMHG | BODY MASS INDEX: 20.98 KG/M2 | TEMPERATURE: 98.4 F

## 2022-05-03 DIAGNOSIS — A04.8 H. PYLORI INFECTION: ICD-10-CM

## 2022-05-03 DIAGNOSIS — E87.1 HYPONATREMIA: ICD-10-CM

## 2022-05-03 PROBLEM — M54.9 UPPER BACK PAIN ON RIGHT SIDE: Status: ACTIVE | Noted: 2022-05-03

## 2022-05-03 PROBLEM — M54.9 UPPER BACK PAIN ON RIGHT SIDE: Status: RESOLVED | Noted: 2022-05-03 | Resolved: 2022-05-03

## 2022-05-03 PROCEDURE — 83013 H PYLORI (C-13) BREATH: CPT

## 2022-05-03 PROCEDURE — 99214 OFFICE O/P EST MOD 30 MIN: CPT | Performed by: FAMILY MEDICINE

## 2022-05-03 ASSESSMENT — FIBROSIS 4 INDEX: FIB4 SCORE: 1.66

## 2022-05-03 ASSESSMENT — PATIENT HEALTH QUESTIONNAIRE - PHQ9: CLINICAL INTERPRETATION OF PHQ2 SCORE: 0

## 2022-05-03 NOTE — ASSESSMENT & PLAN NOTE
Persistent issue  Last checked 4/21 and still positive. Completed treatment for second time but repeat testing has not been completed.   Lately has been bloated.

## 2022-05-03 NOTE — ASSESSMENT & PLAN NOTE
Chronic issue.  Has been evaluated by endocrinology in the past.  Pending work up but has not followed up with endocrinology as recommended.

## 2022-05-03 NOTE — PROGRESS NOTES
"Subjective:     Chief Complaint   Patient presents with   • GI Problem     Gas, Burping,x 2-3 months   • Pain     Center of back,       HPI:   Yuly presents today to discuss the following.    Hyponatremia  Chronic issue.  Has been evaluated by endocrinology in the past.  Pending work up but has not followed up with endocrinology as recommended.    H. pylori infection  Persistent issue  Last checked 4/21 and still positive. Completed treatment for second time but repeat testing has not been completed.   Lately has been bloated.       Past Medical History:   Diagnosis Date   • Cervical radiculopathy    • Foot pain, left    • High risk sexual behavior    • Insomnia, psychophysiological    • Menopause    • Tobacco dependence    • UTI (urinary tract infection)        Current Outpatient Medications Ordered in Epic   Medication Sig Dispense Refill   • rosuvastatin (CRESTOR) 10 MG Tab Take 1 Tablet by mouth every evening. 90 Tablet 3   • aspirin (ASA) 81 MG Chew Tab chewable tablet Take 81 mg by mouth every day.     • Omega-3 Fatty Acids (FISH OIL) 1000 MG Cap capsule Take 1,000 mg by mouth 3 times a day, with meals.       No current Epic-ordered facility-administered medications on file.       Allergies:  Patient has no known allergies.    Health Maintenance: Completed    ROS:  Gen: no fevers/chills, no changes in weight  Eyes: no changes in vision  Pulm: no sob, no cough  CV: no chest pain, no palpitations  GI: no nausea/vomiting, no diarrhea      Objective:     Exam:  /72 (BP Location: Left arm, Patient Position: Sitting, BP Cuff Size: Adult)   Pulse 90   Temp 36.9 °C (98.4 °F) (Temporal)   Ht 1.575 m (5' 2\")   Wt 51.7 kg (114 lb)   LMP 01/01/1989   SpO2 93%   BMI 20.85 kg/m²  Body mass index is 20.85 kg/m².    Constitutional: Alert, no distress, well-groomed.  Skin: Warm, dry, good turgor, no rashes in visible areas.  Eye: Equal, round and reactive, conjunctiva clear, lids normal.  ENMT: Lips without " lesions, good dentition, moist mucous membranes.  Neck: Trachea midline, no masses, no thyromegaly.  Respiratory: Unlabored respiratory effort, no cough.  MSK: Normal gait, moves all extremities.  Neuro: Grossly non-focal.   Psych: Alert and oriented x3, normal affect and mood.        Assessment & Plan:     71 y.o. female with the following -     1. Hyponatremia  Chronic, unchanged condition.  I stressed the importance for her to continue to follow-up with endocrinologist and to complete pending work-up.  She verbalized understanding and will make a follow-up appointment with Dr. Romero.     2. H. pylori infection  Chronic issue.  The patient is due for repeat testing especially since she has been having worsening bloating.  - H. PYLORI, UREA BREATH TEST, ADULT; Future        Return in about 3 months (around 8/3/2022).    Please note that this dictation was created using voice recognition software. I have made every reasonable attempt to correct obvious errors, but I expect that there are errors of grammar and possibly content that I did not discover before finalizing the note.

## 2022-05-05 ENCOUNTER — TELEPHONE (OUTPATIENT)
Dept: MEDICAL GROUP | Facility: PHYSICIAN GROUP | Age: 72
End: 2022-05-05
Payer: MEDICARE

## 2022-05-05 DIAGNOSIS — A04.8 H. PYLORI INFECTION: ICD-10-CM

## 2022-05-05 LAB — UREA BREATH TEST QL: POSITIVE

## 2022-05-05 NOTE — TELEPHONE ENCOUNTER
----- Message from Darwin Paulson M.D. sent at 5/5/2022  1:37 PM PDT -----  Please call patient to let know:    El resultado de la bacteria sigue positivo. Si gusta puedo mandarle un tratamiento mas suave/alternativo al previo

## 2022-05-05 NOTE — TELEPHONE ENCOUNTER
Phone Number Called: [phone]    Call outcome: Did not leave a detailed message. Requested patient to call back.    Message: LVM to please call back

## 2022-05-10 ENCOUNTER — TELEPHONE (OUTPATIENT)
Dept: MEDICAL GROUP | Facility: PHYSICIAN GROUP | Age: 72
End: 2022-05-10
Payer: MEDICARE

## 2022-05-10 RX ORDER — PANTOPRAZOLE SODIUM 40 MG/1
40 TABLET, DELAYED RELEASE ORAL 2 TIMES DAILY
Qty: 28 TABLET | Refills: 0 | Status: SHIPPED | OUTPATIENT
Start: 2022-05-10 | End: 2022-05-24

## 2022-05-10 RX ORDER — METRONIDAZOLE 500 MG/1
500 TABLET ORAL 3 TIMES DAILY
Qty: 42 TABLET | Refills: 0 | Status: SHIPPED | OUTPATIENT
Start: 2022-05-10 | End: 2022-05-24

## 2022-05-10 RX ORDER — CLARITHROMYCIN 500 MG/1
500 TABLET, COATED ORAL 2 TIMES DAILY
Qty: 28 TABLET | Refills: 0 | Status: SHIPPED | OUTPATIENT
Start: 2022-05-10 | End: 2022-05-24

## 2022-05-10 NOTE — TELEPHONE ENCOUNTER
Phone Number Called: [phone]     Call outcome: Spoke to patient regarding message below.    Message: Spoke to patient and informed her of the following:  El resultado de la bacteria sigue positivo. Si gusta puedo mandarle un tratamiento mas suave/alternativo al previo  Patient agrees to alternative medication as she just wants to be done with this bacteria .

## 2022-08-09 ENCOUNTER — OFFICE VISIT (OUTPATIENT)
Dept: MEDICAL GROUP | Facility: PHYSICIAN GROUP | Age: 72
End: 2022-08-09
Payer: MEDICARE

## 2022-08-09 VITALS
SYSTOLIC BLOOD PRESSURE: 108 MMHG | WEIGHT: 110 LBS | OXYGEN SATURATION: 93 % | TEMPERATURE: 97.3 F | HEIGHT: 62 IN | HEART RATE: 85 BPM | BODY MASS INDEX: 20.24 KG/M2 | DIASTOLIC BLOOD PRESSURE: 60 MMHG

## 2022-08-09 DIAGNOSIS — M81.0 OSTEOPOROSIS WITHOUT CURRENT PATHOLOGICAL FRACTURE, UNSPECIFIED OSTEOPOROSIS TYPE: ICD-10-CM

## 2022-08-09 DIAGNOSIS — K92.1 BLOOD IN THE STOOL: ICD-10-CM

## 2022-08-09 DIAGNOSIS — A04.8 H. PYLORI INFECTION: ICD-10-CM

## 2022-08-09 DIAGNOSIS — Z12.31 ENCOUNTER FOR SCREENING MAMMOGRAM FOR BREAST CANCER: ICD-10-CM

## 2022-08-09 DIAGNOSIS — E78.2 MIXED HYPERLIPIDEMIA: ICD-10-CM

## 2022-08-09 DIAGNOSIS — E87.1 HYPONATREMIA: ICD-10-CM

## 2022-08-09 PROCEDURE — 99214 OFFICE O/P EST MOD 30 MIN: CPT | Performed by: FAMILY MEDICINE

## 2022-08-09 ASSESSMENT — FIBROSIS 4 INDEX: FIB4 SCORE: 1.66

## 2022-08-09 NOTE — ASSESSMENT & PLAN NOTE
Persistent issue  Completed H pylori treatment x2     Patient still feels very bloated  She takes probiotics    Last colonoscopy in 2012

## 2022-08-10 ENCOUNTER — HOSPITAL ENCOUNTER (OUTPATIENT)
Dept: LAB | Facility: MEDICAL CENTER | Age: 72
End: 2022-08-10
Attending: FAMILY MEDICINE
Payer: MEDICARE

## 2022-08-10 DIAGNOSIS — E78.2 MIXED HYPERLIPIDEMIA: ICD-10-CM

## 2022-08-10 DIAGNOSIS — E87.1 HYPONATREMIA: ICD-10-CM

## 2022-08-10 LAB
ANION GAP SERPL CALC-SCNC: 11 MMOL/L (ref 7–16)
BUN SERPL-MCNC: 8 MG/DL (ref 8–22)
CALCIUM SERPL-MCNC: 9.2 MG/DL (ref 8.5–10.5)
CHLORIDE SERPL-SCNC: 95 MMOL/L (ref 96–112)
CHOLEST SERPL-MCNC: 108 MG/DL (ref 100–199)
CO2 SERPL-SCNC: 24 MMOL/L (ref 20–33)
CREAT SERPL-MCNC: 0.37 MG/DL (ref 0.5–1.4)
ERYTHROCYTE [DISTWIDTH] IN BLOOD BY AUTOMATED COUNT: 47.1 FL (ref 35.9–50)
FASTING STATUS PATIENT QL REPORTED: NORMAL
GFR SERPLBLD CREATININE-BSD FMLA CKD-EPI: 106 ML/MIN/1.73 M 2
GLUCOSE SERPL-MCNC: 88 MG/DL (ref 65–99)
HCT VFR BLD AUTO: 37.8 % (ref 37–47)
HDLC SERPL-MCNC: 36 MG/DL
HGB BLD-MCNC: 12.4 G/DL (ref 12–16)
LDLC SERPL CALC-MCNC: 58 MG/DL
MCH RBC QN AUTO: 30 PG (ref 27–33)
MCHC RBC AUTO-ENTMCNC: 32.8 G/DL (ref 33.6–35)
MCV RBC AUTO: 91.5 FL (ref 81.4–97.8)
PLATELET # BLD AUTO: 242 K/UL (ref 164–446)
PMV BLD AUTO: 11.2 FL (ref 9–12.9)
POTASSIUM SERPL-SCNC: 4.1 MMOL/L (ref 3.6–5.5)
RBC # BLD AUTO: 4.13 M/UL (ref 4.2–5.4)
SODIUM SERPL-SCNC: 130 MMOL/L (ref 135–145)
TRIGL SERPL-MCNC: 69 MG/DL (ref 0–149)
WBC # BLD AUTO: 5 K/UL (ref 4.8–10.8)

## 2022-08-10 PROCEDURE — 36415 COLL VENOUS BLD VENIPUNCTURE: CPT

## 2022-08-10 PROCEDURE — 80061 LIPID PANEL: CPT

## 2022-08-10 PROCEDURE — 80048 BASIC METABOLIC PNL TOTAL CA: CPT

## 2022-08-10 PROCEDURE — 85027 COMPLETE CBC AUTOMATED: CPT

## 2022-08-12 ENCOUNTER — TELEPHONE (OUTPATIENT)
Dept: MEDICAL GROUP | Facility: PHYSICIAN GROUP | Age: 72
End: 2022-08-12
Payer: MEDICAID

## 2022-08-12 NOTE — RESULT ENCOUNTER NOTE
Phone Number Called: [phone]     Call outcome: Did not leave a detailed message. Requested patient to call back.    Message: LVM to please call back regarding results.

## 2022-08-12 NOTE — TELEPHONE ENCOUNTER
Phone Number Called: [phone]     Call outcome: Spoke to patient regarding message below.    Message: Spoke to patient regarding results.    Patient states she reviewed results and asks if she is anemic. Patient states if there is any reason as to why she feels so tired and weak. Patient also would like any recommendations regarding supplements that may help her with gain energy.    Please advise.

## 2022-08-15 ENCOUNTER — OFFICE VISIT (OUTPATIENT)
Dept: ENDOCRINOLOGY | Facility: MEDICAL CENTER | Age: 72
End: 2022-08-15
Attending: INTERNAL MEDICINE
Payer: MEDICARE

## 2022-08-15 ENCOUNTER — HOSPITAL ENCOUNTER (OUTPATIENT)
Dept: LAB | Facility: MEDICAL CENTER | Age: 72
End: 2022-08-15
Attending: INTERNAL MEDICINE
Payer: MEDICARE

## 2022-08-15 VITALS
HEART RATE: 89 BPM | BODY MASS INDEX: 20.24 KG/M2 | OXYGEN SATURATION: 96 % | SYSTOLIC BLOOD PRESSURE: 126 MMHG | HEIGHT: 62 IN | WEIGHT: 110 LBS | DIASTOLIC BLOOD PRESSURE: 78 MMHG

## 2022-08-15 DIAGNOSIS — E06.3 HASHIMOTO'S DISEASE: ICD-10-CM

## 2022-08-15 DIAGNOSIS — E78.5 DYSLIPIDEMIA: ICD-10-CM

## 2022-08-15 DIAGNOSIS — E22.2 SIADH (SYNDROME OF INAPPROPRIATE ADH PRODUCTION) (HCC): ICD-10-CM

## 2022-08-15 DIAGNOSIS — E87.1 HYPONATREMIA: ICD-10-CM

## 2022-08-15 LAB
T4 FREE SERPL-MCNC: 1.26 NG/DL (ref 0.93–1.7)
TSH SERPL DL<=0.005 MIU/L-ACNC: 1.87 UIU/ML (ref 0.38–5.33)

## 2022-08-15 PROCEDURE — 36415 COLL VENOUS BLD VENIPUNCTURE: CPT

## 2022-08-15 PROCEDURE — 84443 ASSAY THYROID STIM HORMONE: CPT

## 2022-08-15 PROCEDURE — 99214 OFFICE O/P EST MOD 30 MIN: CPT | Performed by: INTERNAL MEDICINE

## 2022-08-15 PROCEDURE — 84439 ASSAY OF FREE THYROXINE: CPT

## 2022-08-15 PROCEDURE — 99211 OFF/OP EST MAY X REQ PHY/QHP: CPT | Performed by: INTERNAL MEDICINE

## 2022-08-15 ASSESSMENT — FIBROSIS 4 INDEX: FIB4 SCORE: 1.773642787236998189

## 2022-08-15 NOTE — PROGRESS NOTES
Chief Complaint: Follow up for Chronic hyponatremia due to SIADH    HPI:     Yuly Gotti is a 71 y.o. female here for follow up of the [facility]    She has long standing hyponatremia with work up ruling out [place]'s disease (21 [place] lase abs negative ACTH and cortisol was normal and TSH and free T4 was normal)  Incidentally she had elevated TPO antibodies of 350    Thank you        She denies hydrochlorothiazide use  She denies use of illicit substances such as ecstasy  She denies history of chronic kidney disease, cirrhosis and congestive heart failure  She admits that she is from [place] and she does not like eating a lot of salt  She claimed to drink 3 L of water a day      She was supposed to follow up with me 3 mos after her first visit but she missed her appt.    I am seeing her today after more than 1 year and this is my second time seeing her      She reports she reduced her water intake to 2 liters daily  ( not a true fluid restriction) and she is trying to eat more salt.      She denies headaches   She denies falls and fractures  She denies vomiting  She denies frequent diarrhea       Her sodium was 130 on 8/10/2022  Serum crea was 0.37  Chloride was low at 95  We dont have updated thyroid labs         Patient's medications, allergies, and social histories were reviewed and updated as appropriate.      ROS:     CONS:     No fever, no chills   EYES:     No diplopia, no blurry vision   CV:           No chest pain, no palpitations   PULM:     No SOB, no cough, no hemoptysis.   GI:            No nausea, no vomiting, no diarrhea, no constipation   ENDO:     No polyuria, no polydipsia, no heat intolerance, no cold intolerance       Past Medical History:  Problem List:  2022-08: Blood in the stool  2022-05: Upper back pain on right side  2021-10: Anti-TPO antibodies present  2021-04: H. pylori infection  2021-01: Mixed hyperlipidemia  2020-08: Rash  2020-06: Hyponatremia  2020-03: Age-related osteoporosis  "without current pathological   fracture  2019-04: Injury of left radial artery  2018-09: High risk for hip fracture  2017-02: Urticaria  2016-11: Chronic cervical radiculopathy  2016-11: Gastroesophageal reflux disease without esophagitis  2016-05: Insomnia  2016-05: Tobacco dependence      Past Surgical History:  Past Surgical History:   Procedure Laterality Date    ARTERY EXPLORATION OR REPAIR Left 4/3/2019    Procedure: RADIAL ARTERY REPAIR;  Surgeon: Wally Colbert M.D.;  Location: SURGERY [facility];  Service: General    TENDON REPAIR Left 4/3/2019    Procedure: REPAIR, TENDON;  Surgeon: Federico Saenz M.D.;  Location: SURGERY [facility];  Service: Orthopedics        Allergies:  Patient has no known allergies.     Social History:  Social History     Tobacco Use    Smoking status: Every Day     Packs/day: 0.25     Types: Cigarettes    Smokeless tobacco: Never   Vaping Use    Vaping Use: Never used   Substance Use Topics    Alcohol use: Yes     Alcohol/week: 0.6 oz     Types: 1 Glasses of wine per week    Drug use: Never        Family History:   family history includes Cancer in her sister.      PHYSICAL EXAM:   Vital signs: /78 (BP Location: Left arm, Patient Position: Sitting, BP Cuff Size: Adult)   Pulse 89   Ht 1.575 m (5' 2\")   Wt 49.9 kg (110 lb)   LMP 01/01/1989   SpO2 96%   BMI 20.12 kg/m²   GENERAL: Well-developed, well-nourished in no apparent distress.   EYE:  No ocular asymmetry, PERRLA  HENT: Pink, moist mucous membranes.    NECK: No thyromegaly.   CARDIOVASCULAR:  No murmurs  LUNGS: Clear breath sounds  ABDOMEN: Soft, nontender   EXTREMITIES: No clubbing, cyanosis, or edema.   NEUROLOGICAL: No gross focal motor abnormalities   LYMPH: No cervical adenopathy palpated.   SKIN: No rashes, lesions.       Labs:  Lab Results   Component Value Date/Time    SODIUM 130 (L) 08/10/2022 11:51 AM    POTASSIUM 4.1 08/10/2022 11:51 AM    CHLORIDE 95 (L) 08/10/2022 11:51 AM    CO2 24 " 08/10/2022 11:51 AM    ANION 11.0 08/10/2022 11:51 AM    GLUCOSE 88 08/10/2022 11:51 AM    BUN 8 08/10/2022 11:51 AM    CREATININE 0.37 (L) 08/10/2022 11:51 AM    CALCIUM 9.2 08/10/2022 11:51 AM    ASTSGOT 22 07/21/2021 09:03 AM    ALTSGPT 14 07/21/2021 09:03 AM    TBILIRUBIN 0.4 07/21/2021 09:03 AM    ALBUMIN 4.3 07/21/2021 09:03 AM    TOTPROTEIN 7.4 07/21/2021 09:03 AM    GLOBULIN 3.1 07/21/2021 09:03 AM    AGRATIO 1.4 07/21/2021 09:03 AM       Lab Results   Component Value Date/Time    SODIUM 130 (L) 08/10/2022 1151    POTASSIUM 4.1 08/10/2022 1151    CHLORIDE 95 (L) 08/10/2022 1151    CO2 24 08/10/2022 1151    GLUCOSE 88 08/10/2022 1151    BUN 8 08/10/2022 1151    CREATININE 0.37 (L) 08/10/2022 1151    CALCIUM 9.2 08/10/2022 1151    ANION 11.0 08/10/2022 1151       Lab Results   Component Value Date/Time    CHOLSTRLTOT 108 08/10/2022 1151    TRIGLYCERIDE 69 08/10/2022 1151    HDL 36 (A) 08/10/2022 1151    LDL 58 08/10/2022 1151       Lab Results   Component Value Date/Time    TSHULTRASEN 2.140 07/21/2021 0903     Lab Results   Component Value Date/Time    FREET4 1.16 07/21/2021 0903     No results found for: FREET3  No results found for: THYSTIMIG    Lab Results   Component Value Date/Time    MICROSOMALA 357.7 (H) 07/21/2021 0903         Imaging:      ASSESSMENT/PLAN:     1. Hyponatremia  Stable  Sodium is acceptable 130   recommend true fluid restriction at less than 1 L/day  Recommend increase solute intake  We went over her labs and work-up  Continue monitoring serum electrolytes follow-up in 6 months    2. SIADH (syndrome of inappropriate ADH production) (HCC)  This is the likely etiology of her hyponatremia I recommend conservative management    3. Dyslipidemia  Stable   continue Crestor  Continue monitoring    4. Hashimoto's disease  Reviewed the risk of developing hypothyroidism  I am getting another set of thyroid labs today and I will update her      Return in about 6 months (around  2/15/2023).      Thank you kindly for allowing me to participate in the thyroid care plan for this patient.    Constantin Romero MD, [facility], [facility]  08/15/22    CC:   Darwin Paulson M.D.

## 2022-08-29 ENCOUNTER — HOSPITAL ENCOUNTER (OUTPATIENT)
Dept: RADIOLOGY | Facility: MEDICAL CENTER | Age: 72
End: 2022-08-29
Attending: FAMILY MEDICINE
Payer: MEDICARE

## 2022-08-29 DIAGNOSIS — Z12.31 ENCOUNTER FOR SCREENING MAMMOGRAM FOR BREAST CANCER: ICD-10-CM

## 2022-08-29 DIAGNOSIS — M81.0 OSTEOPOROSIS WITHOUT CURRENT PATHOLOGICAL FRACTURE, UNSPECIFIED OSTEOPOROSIS TYPE: ICD-10-CM

## 2022-08-29 PROCEDURE — 77063 BREAST TOMOSYNTHESIS BI: CPT

## 2022-08-29 PROCEDURE — 77080 DXA BONE DENSITY AXIAL: CPT

## 2022-08-30 ENCOUNTER — TELEPHONE (OUTPATIENT)
Dept: MEDICAL GROUP | Facility: PHYSICIAN GROUP | Age: 72
End: 2022-08-30
Payer: MEDICAID

## 2022-08-30 NOTE — TELEPHONE ENCOUNTER
Phone Number Called: [phone]     Call outcome: Spoke to patient regarding message below.    Message: Let patient know the following information : Let patient know that the bone scan results are positive for osteopenia only so only weakening of the bone.  No osteoporosis       Patient would like to know about Rosuvastatin if after the 4 weeks would she need to go back to meds as she was taking them before or if PCP will begin to give a lower dose.    Please advise

## 2022-08-30 NOTE — TELEPHONE ENCOUNTER
----- Message from Darwin Paulson M.D. sent at 8/30/2022  2:33 PM PDT -----  Let patient know that the bone scan results are positive for osteopenia only so only weakening of the bone.  No osteoporosis

## 2022-09-19 DIAGNOSIS — E78.2 MIXED HYPERLIPIDEMIA: ICD-10-CM

## 2022-09-19 RX ORDER — ROSUVASTATIN CALCIUM 10 MG/1
TABLET, COATED ORAL
Qty: 90 TABLET | Refills: 3 | Status: SHIPPED | OUTPATIENT
Start: 2022-09-19 | End: 2022-11-22 | Stop reason: SDUPTHER

## 2022-09-21 ENCOUNTER — TELEPHONE (OUTPATIENT)
Dept: MEDICAL GROUP | Facility: PHYSICIAN GROUP | Age: 72
End: 2022-09-21
Payer: MEDICAID

## 2022-09-28 ENCOUNTER — TELEPHONE (OUTPATIENT)
Dept: MEDICAL GROUP | Facility: PHYSICIAN GROUP | Age: 72
End: 2022-09-28
Payer: MEDICAID

## 2022-09-28 NOTE — TELEPHONE ENCOUNTER
VOICEMAIL  1. Caller Name: Yuly Gotti                      Call Back Number: [phone] (home)    2. Message: Pt called asking about the results for this test, and she received a message via [employer] saying she had new results but has been unable to view them.  Pt asked for a cb to be told her results.    Pt's results are in a letter from [facility], please dictate results to give to the Pt.

## 2022-09-29 NOTE — TELEPHONE ENCOUNTER
"Phone Number Called: [phone] (home)     Call outcome: Spoke to patient regarding message below.    Message: Per discussion with Dr Paulson, I read the following from the Pt letter sent by [facility]:    \"The biopsies take on the stomach revealed inflammation. The bacteria H. Pylori was found in your stomach biopsy specimens. H. Pylori is a bacteria that lives in the lining of the stomach and is associated with an increased risk of developing ulcers and gastric cancer. Biopsies taken from the colon lining revealed normal mucosa.     Yuly, I have ordered a tow weeks course of therapy to get rid of the infection (H. Pylori) in your stomach. About one fidelia after you complete the therapy please have a repeat test (called a urea breath test, a simple test) here to verify it is gone.     If you have increased bright red blood per rectum I think you might benefit from hemorrhoid banding treatment in the clinic.\"    I then reiterated what message from Darwin Paulson M.D. below.     Pt asked if she needed to follow up with Dr Paulson or the [facility]. Informed the Pt Dr Paulson wanted to make sure she knew to follow up with [facility] for these issues.    No further questions at this time.  "

## 2022-11-11 ENCOUNTER — PATIENT MESSAGE (OUTPATIENT)
Dept: HEALTH INFORMATION MANAGEMENT | Facility: OTHER | Age: 72
End: 2022-11-11

## 2022-11-22 ENCOUNTER — OFFICE VISIT (OUTPATIENT)
Dept: MEDICAL GROUP | Facility: PHYSICIAN GROUP | Age: 72
End: 2022-11-22
Payer: MEDICARE

## 2022-11-22 VITALS
DIASTOLIC BLOOD PRESSURE: 50 MMHG | HEIGHT: 62 IN | WEIGHT: 111 LBS | HEART RATE: 71 BPM | OXYGEN SATURATION: 97 % | BODY MASS INDEX: 20.43 KG/M2 | SYSTOLIC BLOOD PRESSURE: 122 MMHG | TEMPERATURE: 97.5 F

## 2022-11-22 DIAGNOSIS — L30.9 ECZEMA, UNSPECIFIED TYPE: ICD-10-CM

## 2022-11-22 DIAGNOSIS — K21.9 GASTROESOPHAGEAL REFLUX DISEASE WITHOUT ESOPHAGITIS: ICD-10-CM

## 2022-11-22 DIAGNOSIS — E78.2 MIXED HYPERLIPIDEMIA: ICD-10-CM

## 2022-11-22 DIAGNOSIS — A04.8 H. PYLORI INFECTION: ICD-10-CM

## 2022-11-22 PROCEDURE — 99214 OFFICE O/P EST MOD 30 MIN: CPT | Performed by: FAMILY MEDICINE

## 2022-11-22 RX ORDER — TRIAMCINOLONE ACETONIDE 1 MG/G
CREAM TOPICAL
Qty: 80 G | Refills: 3 | Status: SHIPPED | OUTPATIENT
Start: 2022-11-22 | End: 2023-03-02

## 2022-11-22 RX ORDER — ROSUVASTATIN CALCIUM 5 MG/1
5 TABLET, COATED ORAL EVERY EVENING
Qty: 90 TABLET | Refills: 3 | Status: SHIPPED | OUTPATIENT
Start: 2022-11-22 | End: 2023-08-07

## 2022-11-22 RX ORDER — PANTOPRAZOLE SODIUM 40 MG/1
40 TABLET, DELAYED RELEASE ORAL DAILY
Qty: 30 TABLET | Refills: 3 | Status: SHIPPED | OUTPATIENT
Start: 2022-11-22 | End: 2022-12-19

## 2022-11-22 ASSESSMENT — FIBROSIS 4 INDEX: FIB4 SCORE: 1.797939263774491315

## 2022-11-22 NOTE — PROGRESS NOTES
"Subjective:     Chief Complaint   Patient presents with    Follow-Up     3 month       HPI:   Yuly presents today to discuss the following.    H. pylori infection  Chronic issue  Has had resistant H pylori in spite of treatments x2.   Still has epigastric pain    Gastroesophageal reflux disease without esophagitis  Chronic issue  Exacerbated by H pylori infection  Has taken omeprazole PRN    Mixed hyperlipidemia  Chronic issue  On statin.     Past Medical History:   Diagnosis Date    Cervical radiculopathy     Foot pain, left     High risk sexual behavior     Insomnia, psychophysiological     Menopause     Tobacco dependence     UTI (urinary tract infection)        Current Outpatient Medications Ordered in Epic   Medication Sig Dispense Refill    pantoprazole (PROTONIX) 40 MG Tablet Delayed Response Take 1 Tablet by mouth every day. 30 Tablet 3    rosuvastatin (CRESTOR) 5 MG Tab Take 1 Tablet by mouth every evening. 90 Tablet 3    triamcinolone acetonide (KENALOG) 0.1 % Cream Apply to affected areas twice daily 80 g 3    aspirin (ASA) 81 MG Chew Tab chewable tablet Take 81 mg by mouth every day.      Omega-3 Fatty Acids (FISH OIL) 1000 MG Cap capsule Take 1,000 mg by mouth 3 times a day, with meals.       No current Epic-ordered facility-administered medications on file.       Allergies:  Patient has no known allergies.    Health Maintenance: Completed    ROS:  Gen: no fevers/chills, no changes in weight  Eyes: no changes in vision  Pulm: no sob, no cough  CV: no chest pain, no palpitations  GI: no nausea/vomiting, no diarrhea      Objective:     Exam:  /50 (BP Location: Right arm, Patient Position: Sitting, BP Cuff Size: Adult)   Pulse 71   Temp 36.4 °C (97.5 °F) (Temporal)   Ht 1.575 m (5' 2\")   Wt 50.3 kg (111 lb)   LMP 01/01/1989   SpO2 97%   BMI 20.30 kg/m²  Body mass index is 20.3 kg/m².      Constitutional: Alert, no distress, well-groomed.  Skin: Warm, dry, good turgor, no rashes in visible " areas.  Eye: Equal, round and reactive, conjunctiva clear, lids normal.  ENMT: Lips without lesions, good dentition, moist mucous membranes.  Neck: Trachea midline, no masses, no thyromegaly.  Respiratory: Unlabored respiratory effort, no cough.  MSK: Normal gait, moves all extremities.  Neuro: Grossly non-focal.   Psych: Alert and oriented x3, normal affect and mood.        Assessment & Plan:     72 y.o. female with the following -     1. H. pylori infection  Chronic, recurrent condition.  Has completed multiple treatments for H. pylori without success.  I recommend she follows up with gastroenterology for further treatment options.    2. Gastroesophageal reflux disease without esophagitis  Chronic, stable condition.  Continue with pantoprazole as needed.  - pantoprazole (PROTONIX) 40 MG Tablet Delayed Response; Take 1 Tablet by mouth every day.  Dispense: 30 Tablet; Refill: 3    3. Mixed hyperlipidemia  Chronic condition.  Lowering dosage of rosuvastatin.  - rosuvastatin (CRESTOR) 5 MG Tab; Take 1 Tablet by mouth every evening.  Dispense: 90 Tablet; Refill: 3    4. Eczema, unspecified type  Chronic condition.  Will prescribe Kenalog cream today and refer to dermatology.  - Referral to Dermatology      Return in about 3 months (around 2/22/2023).          Please note that this dictation was created using voice recognition software. I have made every reasonable attempt to correct obvious errors, but I expect that there are errors of grammar and possibly content that I did not discover before finalizing the note.

## 2023-01-12 ENCOUNTER — TELEPHONE (OUTPATIENT)
Dept: HEALTH INFORMATION MANAGEMENT | Facility: OTHER | Age: 73
End: 2023-01-12
Payer: MEDICARE

## 2023-02-21 ENCOUNTER — OFFICE VISIT (OUTPATIENT)
Dept: ENDOCRINOLOGY | Facility: MEDICAL CENTER | Age: 73
End: 2023-02-21
Attending: INTERNAL MEDICINE
Payer: MEDICARE

## 2023-02-21 VITALS
HEIGHT: 62 IN | BODY MASS INDEX: 20.61 KG/M2 | SYSTOLIC BLOOD PRESSURE: 122 MMHG | WEIGHT: 112 LBS | DIASTOLIC BLOOD PRESSURE: 72 MMHG

## 2023-02-21 DIAGNOSIS — E87.1 HYPONATREMIA: ICD-10-CM

## 2023-02-21 DIAGNOSIS — E06.3 HASHIMOTO'S DISEASE: ICD-10-CM

## 2023-02-21 DIAGNOSIS — E22.2 SIADH (SYNDROME OF INAPPROPRIATE ADH PRODUCTION) (HCC): ICD-10-CM

## 2023-02-21 PROCEDURE — 99211 OFF/OP EST MAY X REQ PHY/QHP: CPT | Performed by: INTERNAL MEDICINE

## 2023-02-21 PROCEDURE — 99214 OFFICE O/P EST MOD 30 MIN: CPT | Performed by: INTERNAL MEDICINE

## 2023-02-21 ASSESSMENT — FIBROSIS 4 INDEX: FIB4 SCORE: 1.797939263774491315

## 2023-02-21 ASSESSMENT — PATIENT HEALTH QUESTIONNAIRE - PHQ9: CLINICAL INTERPRETATION OF PHQ2 SCORE: 0

## 2023-02-21 NOTE — PROGRESS NOTES
Chief Complaint: Follow up for Chronic hyponatremia due to SIADH    HPI:     Yuly Gotti is a 72 y.o. female here for follow up of the [facility]    She has long standing hyponatremia with work up ruling out [place]'s disease (21 [place] lase abs negative ACTH and cortisol was normal and TSH and free T4 was normal)  Incidentally she had elevated TPO antibodies of 350        She denies hydrochlorothiazide use  She denies use of illicit substances such as ecstasy  She denies history of chronic kidney disease, cirrhosis and congestive heart failure  She admits that she is from [place] and she does not like eating a lot of salt  She claimed that she was drinking 3 L/day  After her initial visit I placed her on water restriction she was supposed to see me again after 3 months but she did not follow-up until after 1 year        I am seeing the patient again today for follow-up after her last visit on August 15, 2022.  Unfortunately she did not complete the lab orders that were requested for her.        She denies headaches   She denies falls and fractures  She denies vomiting  She denies frequent diarrhea       Her sodium was 130 on 8/10/2022  Serum crea was 0.37  Chloride was low at 95      We do not have an updated serum sodium and TSH        Patient's medications, allergies, and social histories were reviewed and updated as appropriate.      ROS:     CONS:     No fever, no chills   EYES:     No diplopia, no blurry vision   CV:           No chest pain, no palpitations   PULM:     No SOB, no cough, no hemoptysis.   GI:            No nausea, no vomiting, no diarrhea, no constipation   ENDO:     No polyuria, no polydipsia, no heat intolerance, no cold intolerance       Past Medical History:  Problem List:  2022-08: Hashimoto's disease  2022-08: SIADH (syndrome of inappropriate ADH production) ([facility])  2022-08: Blood in the stool  2022-05: Upper back pain on right side  2021-10: Anti-TPO antibodies present  2021-04: H.  "pylori infection  2021-01: Mixed hyperlipidemia  2020-08: Rash  2020-06: Hyponatremia  2020-03: Age-related osteoporosis without current pathological   fracture  2019-04: Injury of left radial artery  2018-09: High risk for hip fracture  2017-02: Urticaria  2016-11: Chronic cervical radiculopathy  2016-11: Gastroesophageal reflux disease without esophagitis  2016-05: Insomnia  2016-05: Tobacco dependence      Past Surgical History:  Past Surgical History:   Procedure Laterality Date    ARTERY EXPLORATION OR REPAIR Left 4/3/2019    Procedure: RADIAL ARTERY REPAIR;  Surgeon: Wally Colbert M.D.;  Location: SURGERY [facility];  Service: General    TENDON REPAIR Left 4/3/2019    Procedure: REPAIR, TENDON;  Surgeon: Federico Saenz M.D.;  Location: SURGERY [facility];  Service: Orthopedics        Allergies:  Patient has no known allergies.     Social History:  Social History     Tobacco Use    Smoking status: Every Day     Packs/day: 0.25     Types: Cigarettes    Smokeless tobacco: Never   Vaping Use    Vaping Use: Never used   Substance Use Topics    Alcohol use: Yes     Alcohol/week: 0.6 oz     Types: 1 Glasses of wine per week    Drug use: Never        Family History:   family history includes Cancer in her sister.      PHYSICAL EXAM:   Vital signs: /72 (BP Location: Right arm, Patient Position: Sitting)   Ht 1.575 m (5' 2\")   Wt 50.8 kg (112 lb)   LMP 01/01/1989   BMI 20.49 kg/m²   GENERAL: Well-developed, well-nourished in no apparent distress.   EYE:  No ocular asymmetry, PERRLA  HENT: Pink, moist mucous membranes.    NECK: No thyromegaly.   CARDIOVASCULAR:  No murmurs  LUNGS: Clear breath sounds  ABDOMEN: Soft, nontender   EXTREMITIES: No clubbing, cyanosis, or edema.   NEUROLOGICAL: No gross focal motor abnormalities   LYMPH: No cervical adenopathy palpated.   SKIN: No rashes, lesions.       Labs:  Lab Results   Component Value Date/Time    SODIUM 130 (L) 08/10/2022 11:51 AM    POTASSIUM " 4.1 08/10/2022 11:51 AM    CHLORIDE 95 (L) 08/10/2022 11:51 AM    CO2 24 08/10/2022 11:51 AM    ANION 11.0 08/10/2022 11:51 AM    GLUCOSE 88 08/10/2022 11:51 AM    BUN 8 08/10/2022 11:51 AM    CREATININE 0.37 (L) 08/10/2022 11:51 AM    CALCIUM 9.2 08/10/2022 11:51 AM    ASTSGOT 22 07/21/2021 09:03 AM    ALTSGPT 14 07/21/2021 09:03 AM    TBILIRUBIN 0.4 07/21/2021 09:03 AM    ALBUMIN 4.3 07/21/2021 09:03 AM    TOTPROTEIN 7.4 07/21/2021 09:03 AM    GLOBULIN 3.1 07/21/2021 09:03 AM    AGRATIO 1.4 07/21/2021 09:03 AM       Lab Results   Component Value Date/Time    SODIUM 130 (L) 08/10/2022 1151    POTASSIUM 4.1 08/10/2022 1151    CHLORIDE 95 (L) 08/10/2022 1151    CO2 24 08/10/2022 1151    GLUCOSE 88 08/10/2022 1151    BUN 8 08/10/2022 1151    CREATININE 0.37 (L) 08/10/2022 1151    CALCIUM 9.2 08/10/2022 1151    ANION 11.0 08/10/2022 1151       Lab Results   Component Value Date/Time    CHOLSTRLTOT 108 08/10/2022 1151    TRIGLYCERIDE 69 08/10/2022 1151    HDL 36 (A) 08/10/2022 1151    LDL 58 08/10/2022 1151       Lab Results   Component Value Date/Time    TSHULTRASEN 2.140 07/21/2021 0903     Lab Results   Component Value Date/Time    FREET4 1.16 07/21/2021 0903     No results found for: FREET3  No results found for: THYSTIMIG    Lab Results   Component Value Date/Time    MICROSOMALA 357.7 (H) 07/21/2021 0903         Imaging:      ASSESSMENT/PLAN:     1. Hyponatremia  Stable  Patient was advised to complete the labs that were previously ordered last visit  I will update her  Reminded patient to get labs done prior to next visit  I will see her in 7 months    2. SIADH (syndrome of inappropriate ADH production) (HCC)  This is the likely etiology of her hyponatremia   I recommend conservative management with fluid restriction      4. Hashimoto's disease  Reviewed the risk of developing hypothyroidism  Patient was asked to complete the labs that were previously ordered last time      Return in about 7 months (around  9/21/2023).      Thank you kindly for allowing me to participate in the thyroid care plan for this patient.    Constantin Romero MD, FACE, [facility]      CC:   Darwin Paulson M.D.

## 2023-03-02 ENCOUNTER — OFFICE VISIT (OUTPATIENT)
Dept: MEDICAL GROUP | Facility: PHYSICIAN GROUP | Age: 73
End: 2023-03-02
Payer: MEDICARE

## 2023-03-02 VITALS
OXYGEN SATURATION: 98 % | WEIGHT: 112.6 LBS | DIASTOLIC BLOOD PRESSURE: 60 MMHG | HEART RATE: 75 BPM | TEMPERATURE: 98.4 F | SYSTOLIC BLOOD PRESSURE: 132 MMHG | BODY MASS INDEX: 20.72 KG/M2 | HEIGHT: 62 IN

## 2023-03-02 DIAGNOSIS — E22.2 SIADH (SYNDROME OF INAPPROPRIATE ADH PRODUCTION) (HCC): ICD-10-CM

## 2023-03-02 DIAGNOSIS — E78.2 MIXED HYPERLIPIDEMIA: ICD-10-CM

## 2023-03-02 DIAGNOSIS — K21.9 GASTROESOPHAGEAL REFLUX DISEASE WITHOUT ESOPHAGITIS: ICD-10-CM

## 2023-03-02 PROCEDURE — 99214 OFFICE O/P EST MOD 30 MIN: CPT | Performed by: FAMILY MEDICINE

## 2023-03-02 RX ORDER — TRIAMCINOLONE ACETONIDE 1 MG/G
OINTMENT TOPICAL
Qty: 80 G | Refills: 3 | Status: SHIPPED | OUTPATIENT
Start: 2023-03-02 | End: 2024-01-25

## 2023-03-02 ASSESSMENT — FIBROSIS 4 INDEX: FIB4 SCORE: 1.797939263774491315

## 2023-03-02 NOTE — ASSESSMENT & PLAN NOTE
Chronic issue  Pending labs though I reminded patient back in November to complete blood work.  Next appt is in 9/23

## 2023-03-02 NOTE — PROGRESS NOTES
"Subjective:     Chief Complaint   Patient presents with    Follow-Up       HPI:   Yuly presents today to discuss the following.    SIADH (syndrome of inappropriate ADH production) (HCC)  Chronic issue  Pending labs though I reminded patient back in November to complete blood work.  Next appt is in 9/23    Mixed hyperlipidemia  Chronic issue  On statin  Stable 8/22    Gastroesophageal reflux disease without esophagitis  Chronic issue  On PPI  Has tested positive for h pylori    Past Medical History:   Diagnosis Date    Cervical radiculopathy     Foot pain, left     High risk sexual behavior     Insomnia, psychophysiological     Menopause     Tobacco dependence     UTI (urinary tract infection)        Current Outpatient Medications Ordered in Epic   Medication Sig Dispense Refill    triamcinolone acetonide (KENALOG) 0.1 % Ointment Apply to affected areas twice daily 80 g 3    pantoprazole (PROTONIX) 40 MG Tablet Delayed Response TAKE 1 TABLET BY MOUTH EVERY DAY (Patient taking differently: Take 40 mg by mouth every day. Takes as needed for upset stomach.) 90 Tablet 3    rosuvastatin (CRESTOR) 5 MG Tab Take 1 Tablet by mouth every evening. 90 Tablet 3    aspirin (ASA) 81 MG Chew Tab chewable tablet Take 81 mg by mouth every day.      Omega-3 Fatty Acids (FISH OIL) 1000 MG Cap capsule Take 1,000 mg by mouth every day.       No current [facility]-ordered facility-administered medications on file.       Allergies:  Patient has no known allergies.    Health Maintenance: Completed    ROS:  Gen: no fevers/chills, no changes in weight  Eyes: no changes in vision  Pulm: no sob, no cough  CV: no chest pain, no palpitations  GI: no nausea/vomiting, no diarrhea      Objective:     Exam:  /60 (BP Location: Left arm, Patient Position: Sitting, BP Cuff Size: Small adult)   Pulse 75   Temp 36.9 °C (98.4 °F) (Temporal)   Ht 1.575 m (5' 2\")   Wt 51.1 kg (112 lb 9.6 oz)   LMP 01/01/1989   SpO2 98%   BMI 20.59 kg/m²  Body mass " index is 20.59 kg/m².      Constitutional: Alert, no distress, well-groomed.  Skin: Warm, dry, good turgor, no rashes in visible areas.  Eye: Equal, round and reactive, conjunctiva clear, lids normal.  ENMT: Lips without lesions, good dentition, moist mucous membranes.  Neck: Trachea midline, no masses, no thyromegaly.  Respiratory: Unlabored respiratory effort, no cough.  MSK: Normal gait, moves all extremities.  Neuro: Grossly non-focal.   Psych: Alert and oriented x3, normal affect and mood.        Assessment & Plan:     72 y.o. female with the following -     1. SIADH (syndrome of inappropriate ADH production) (HCC)  Chronic, stable condition.  Patient should continue to follow-up with endocrinology.  I reminded her once again to complete her blood work prior to seeing Dr. Romero.     2. Mixed hyperlipidemia  Chronic, stable condition.  Continue with cholesterol medicine.  Due for repeat labs August 2023.    3. Gastroesophageal reflux disease without esophagitis  Chronic, stable condition.  Continue with PPI.  I recommend she continues to follow-up with gastroenterology.      Return in about 6 months (around 9/2/2023).          Please note that this dictation was created using voice recognition software. I have made every reasonable attempt to correct obvious errors, but I expect that there are errors of grammar and possibly content that I did not discover before finalizing the note.

## 2023-07-11 ENCOUNTER — OFFICE VISIT (OUTPATIENT)
Dept: MEDICAL GROUP | Facility: PHYSICIAN GROUP | Age: 73
End: 2023-07-11
Payer: MEDICARE

## 2023-07-11 VITALS
DIASTOLIC BLOOD PRESSURE: 54 MMHG | HEIGHT: 62 IN | BODY MASS INDEX: 20.35 KG/M2 | WEIGHT: 110.6 LBS | SYSTOLIC BLOOD PRESSURE: 120 MMHG | HEART RATE: 88 BPM | OXYGEN SATURATION: 96 % | TEMPERATURE: 96.8 F

## 2023-07-11 DIAGNOSIS — R73.01 ABNORMAL FASTING GLUCOSE: ICD-10-CM

## 2023-07-11 DIAGNOSIS — E22.2 SIADH (SYNDROME OF INAPPROPRIATE ADH PRODUCTION) (HCC): ICD-10-CM

## 2023-07-11 DIAGNOSIS — E78.2 MIXED HYPERLIPIDEMIA: ICD-10-CM

## 2023-07-11 PROCEDURE — 3078F DIAST BP <80 MM HG: CPT | Performed by: FAMILY MEDICINE

## 2023-07-11 PROCEDURE — 3074F SYST BP LT 130 MM HG: CPT | Performed by: FAMILY MEDICINE

## 2023-07-11 PROCEDURE — 99214 OFFICE O/P EST MOD 30 MIN: CPT | Performed by: FAMILY MEDICINE

## 2023-07-11 ASSESSMENT — FIBROSIS 4 INDEX: FIB4 SCORE: 1.797939263774491315

## 2023-07-11 NOTE — PROGRESS NOTES
"Subjective:     Chief Complaint   Patient presents with    Follow-Up       HPI:   HAROON presents today to discuss the following.    SIADH (syndrome of inappropriate ADH production) (HCC)  Chronic issue  Pending labs  To follow up with endo 1/24    Past Medical History:   Diagnosis Date    Cervical radiculopathy     Foot pain, left     High risk sexual behavior     Insomnia, psychophysiological     Menopause     Tobacco dependence     UTI (urinary tract infection)        Current Outpatient Medications Ordered in Epic   Medication Sig Dispense Refill    triamcinolone acetonide (KENALOG) 0.1 % Ointment Apply to affected areas twice daily 80 g 3    pantoprazole (PROTONIX) 40 MG Tablet Delayed Response TAKE 1 TABLET BY MOUTH EVERY DAY (Patient taking differently: Take 40 mg by mouth every day. Takes as needed for upset stomach.) 90 Tablet 3    rosuvastatin (CRESTOR) 5 MG Tab Take 1 Tablet by mouth every evening. 90 Tablet 3    aspirin (ASA) 81 MG Chew Tab chewable tablet Take 81 mg by mouth every day.      Omega-3 Fatty Acids (FISH OIL) 1000 MG Cap capsule Take 1,000 mg by mouth every day.       No current [facility]-ordered facility-administered medications on file.       Allergies:  Patient has no known allergies.    Health Maintenance: Completed    ROS:  Gen: no fevers/chills, no changes in weight  Eyes: no changes in vision  Pulm: no sob, no cough  CV: no chest pain, no palpitations  GI: no nausea/vomiting, no diarrhea      Objective:     Exam:  /54 (BP Location: Left arm, Patient Position: Sitting, BP Cuff Size: Adult)   Pulse 88   Temp 36 °C (96.8 °F) (Temporal)   Ht 1.575 m (5' 2\")   Wt 50.2 kg (110 lb 9.6 oz)   LMP 01/01/1989   SpO2 96%   BMI 20.23 kg/m²  Body mass index is 20.23 kg/m².      Constitutional: Alert, no distress, well-groomed.  Skin: Warm, dry, good turgor, no rashes in visible areas.  Eye: Equal, round and reactive, conjunctiva clear, lids normal.  ENMT: Lips without lesions, good dentition, " moist mucous membranes.  Neck: Trachea midline, no masses, no thyromegaly.  Respiratory: Unlabored respiratory effort, no cough.  MSK: Normal gait, moves all extremities.  Neuro: Grossly non-focal.   Psych: Alert and oriented x3, normal affect and mood.        Assessment & Plan:     72 y.o. female with the following -     1. Mixed hyperlipidemia  Chronic, stable condition.  Continue with statin.  Due for repeat labs.  - Lipid Profile; Future    2. SIADH (syndrome of inappropriate ADH production) (HCC)  Chronic, stable condition.  Recommend she completes pending blood work and to follow-up with endocrinology.  - Comp Metabolic Panel; Future    3. Abnormal fasting glucose  - HEMOGLOBIN A1C; Future      No follow-ups on file.          Please note that this dictation was created using voice recognition software. I have made every reasonable attempt to correct obvious errors, but I expect that there are errors of grammar and possibly content that I did not discover before finalizing the note.

## 2023-07-12 ENCOUNTER — HOSPITAL ENCOUNTER (OUTPATIENT)
Dept: LAB | Facility: MEDICAL CENTER | Age: 73
End: 2023-07-12
Attending: FAMILY MEDICINE
Payer: MEDICARE

## 2023-07-12 ENCOUNTER — HOSPITAL ENCOUNTER (OUTPATIENT)
Dept: LAB | Facility: MEDICAL CENTER | Age: 73
End: 2023-07-12
Attending: INTERNAL MEDICINE
Payer: MEDICARE

## 2023-07-12 DIAGNOSIS — E22.2 SIADH (SYNDROME OF INAPPROPRIATE ADH PRODUCTION) (HCC): ICD-10-CM

## 2023-07-12 DIAGNOSIS — E78.5 DYSLIPIDEMIA: ICD-10-CM

## 2023-07-12 DIAGNOSIS — E78.2 MIXED HYPERLIPIDEMIA: ICD-10-CM

## 2023-07-12 DIAGNOSIS — E06.3 HASHIMOTO'S DISEASE: ICD-10-CM

## 2023-07-12 DIAGNOSIS — R73.01 ABNORMAL FASTING GLUCOSE: ICD-10-CM

## 2023-07-12 DIAGNOSIS — E87.1 HYPONATREMIA: ICD-10-CM

## 2023-07-12 LAB
ALBUMIN SERPL BCP-MCNC: 4.3 G/DL (ref 3.2–4.9)
ALBUMIN/GLOB SERPL: 1.7 G/DL
ALP SERPL-CCNC: 50 U/L (ref 30–99)
ALT SERPL-CCNC: 15 U/L (ref 2–50)
ANION GAP SERPL CALC-SCNC: 12 MMOL/L (ref 7–16)
AST SERPL-CCNC: 23 U/L (ref 12–45)
BILIRUB SERPL-MCNC: 0.8 MG/DL (ref 0.1–1.5)
BUN SERPL-MCNC: 5 MG/DL (ref 8–22)
CALCIUM ALBUM COR SERPL-MCNC: 8.8 MG/DL (ref 8.5–10.5)
CALCIUM SERPL-MCNC: 9 MG/DL (ref 8.5–10.5)
CHLORIDE SERPL-SCNC: 92 MMOL/L (ref 96–112)
CHOLEST SERPL-MCNC: 101 MG/DL (ref 100–199)
CO2 SERPL-SCNC: 23 MMOL/L (ref 20–33)
CREAT SERPL-MCNC: 0.37 MG/DL (ref 0.5–1.4)
EST. AVERAGE GLUCOSE BLD GHB EST-MCNC: 123 MG/DL
FASTING STATUS PATIENT QL REPORTED: NORMAL
GFR SERPLBLD CREATININE-BSD FMLA CKD-EPI: 105 ML/MIN/1.73 M 2
GLOBULIN SER CALC-MCNC: 2.5 G/DL (ref 1.9–3.5)
GLUCOSE SERPL-MCNC: 82 MG/DL (ref 65–99)
HBA1C MFR BLD: 5.9 % (ref 4–5.6)
HDLC SERPL-MCNC: 36 MG/DL
LDLC SERPL CALC-MCNC: 52 MG/DL
OSMOLALITY SERPL: 263 MOSM/KG H2O (ref 278–298)
POTASSIUM SERPL-SCNC: 4.5 MMOL/L (ref 3.6–5.5)
PROT SERPL-MCNC: 6.8 G/DL (ref 6–8.2)
SODIUM SERPL-SCNC: 127 MMOL/L (ref 135–145)
T4 FREE SERPL-MCNC: 1.18 NG/DL (ref 0.93–1.7)
TRIGL SERPL-MCNC: 67 MG/DL (ref 0–149)
TSH SERPL DL<=0.005 MIU/L-ACNC: 0.74 UIU/ML (ref 0.38–5.33)

## 2023-07-12 PROCEDURE — 80061 LIPID PANEL: CPT

## 2023-07-12 PROCEDURE — 83036 HEMOGLOBIN GLYCOSYLATED A1C: CPT

## 2023-07-12 PROCEDURE — 84300 ASSAY OF URINE SODIUM: CPT

## 2023-07-12 PROCEDURE — 84443 ASSAY THYROID STIM HORMONE: CPT

## 2023-07-12 PROCEDURE — 36415 COLL VENOUS BLD VENIPUNCTURE: CPT

## 2023-07-12 PROCEDURE — 84439 ASSAY OF FREE THYROXINE: CPT

## 2023-07-12 PROCEDURE — 83935 ASSAY OF URINE OSMOLALITY: CPT

## 2023-07-12 PROCEDURE — 80053 COMPREHEN METABOLIC PANEL: CPT

## 2023-07-12 PROCEDURE — 83930 ASSAY OF BLOOD OSMOLALITY: CPT

## 2023-07-13 LAB
OSMOLALITY UR: 186 MOSM/KG H2O (ref 300–900)
SODIUM UR-SCNC: 22 MMOL/L

## 2023-07-16 DIAGNOSIS — E22.2 SIADH (SYNDROME OF INAPPROPRIATE ADH PRODUCTION) (HCC): ICD-10-CM

## 2023-07-16 DIAGNOSIS — E87.1 HYPONATREMIA: ICD-10-CM

## 2023-07-16 NOTE — PROGRESS NOTES
Patient has chronic hyponatremia from SIADH    I have recommended fluid restriction in the past but it appears to be not working    Will order Urea powder 30grams daily in divided doses    Advised patient that her sodium or CMP needs to be rechecked prior to follow up

## 2023-08-07 DIAGNOSIS — E78.2 MIXED HYPERLIPIDEMIA: ICD-10-CM

## 2023-08-07 RX ORDER — ROSUVASTATIN CALCIUM 5 MG/1
5 TABLET, COATED ORAL EVERY EVENING
Qty: 100 TABLET | Refills: 3 | Status: SHIPPED | OUTPATIENT
Start: 2023-08-07

## 2023-12-05 ENCOUNTER — TELEPHONE (OUTPATIENT)
Dept: HEALTH INFORMATION MANAGEMENT | Facility: OTHER | Age: 73
End: 2023-12-05
Payer: MEDICARE

## 2024-01-17 ENCOUNTER — HOSPITAL ENCOUNTER (OUTPATIENT)
Dept: LAB | Facility: MEDICAL CENTER | Age: 74
End: 2024-01-17
Attending: INTERNAL MEDICINE
Payer: MEDICARE

## 2024-01-17 DIAGNOSIS — E06.3 HASHIMOTO'S DISEASE: ICD-10-CM

## 2024-01-17 DIAGNOSIS — E22.2 SIADH (SYNDROME OF INAPPROPRIATE ADH PRODUCTION) (HCC): ICD-10-CM

## 2024-01-17 DIAGNOSIS — E87.1 HYPONATREMIA: ICD-10-CM

## 2024-01-17 LAB
ALBUMIN SERPL BCP-MCNC: 4.2 G/DL (ref 3.2–4.9)
ALBUMIN/GLOB SERPL: 1.5 G/DL
ALP SERPL-CCNC: 47 U/L (ref 30–99)
ALT SERPL-CCNC: 19 U/L (ref 2–50)
ANION GAP SERPL CALC-SCNC: 9 MMOL/L (ref 7–16)
AST SERPL-CCNC: 24 U/L (ref 12–45)
BILIRUB SERPL-MCNC: 0.5 MG/DL (ref 0.1–1.5)
BUN SERPL-MCNC: 11 MG/DL (ref 8–22)
CALCIUM ALBUM COR SERPL-MCNC: 8.7 MG/DL (ref 8.5–10.5)
CALCIUM SERPL-MCNC: 8.9 MG/DL (ref 8.5–10.5)
CHLORIDE SERPL-SCNC: 96 MMOL/L (ref 96–112)
CO2 SERPL-SCNC: 28 MMOL/L (ref 20–33)
CREAT SERPL-MCNC: 0.37 MG/DL (ref 0.5–1.4)
GFR SERPLBLD CREATININE-BSD FMLA CKD-EPI: 105 ML/MIN/1.73 M 2
GLOBULIN SER CALC-MCNC: 2.8 G/DL (ref 1.9–3.5)
GLUCOSE SERPL-MCNC: 97 MG/DL (ref 65–99)
POTASSIUM SERPL-SCNC: 4.4 MMOL/L (ref 3.6–5.5)
PROT SERPL-MCNC: 7 G/DL (ref 6–8.2)
SODIUM SERPL-SCNC: 133 MMOL/L (ref 135–145)

## 2024-01-17 PROCEDURE — 80053 COMPREHEN METABOLIC PANEL: CPT

## 2024-01-17 PROCEDURE — 36415 COLL VENOUS BLD VENIPUNCTURE: CPT

## 2024-01-23 ENCOUNTER — OFFICE VISIT (OUTPATIENT)
Dept: ENDOCRINOLOGY | Facility: MEDICAL CENTER | Age: 74
End: 2024-01-23
Attending: INTERNAL MEDICINE
Payer: MEDICARE

## 2024-01-23 VITALS
WEIGHT: 114 LBS | SYSTOLIC BLOOD PRESSURE: 120 MMHG | OXYGEN SATURATION: 94 % | DIASTOLIC BLOOD PRESSURE: 64 MMHG | BODY MASS INDEX: 20.98 KG/M2 | HEART RATE: 95 BPM | HEIGHT: 62 IN

## 2024-01-23 DIAGNOSIS — E87.1 HYPONATREMIA: ICD-10-CM

## 2024-01-23 DIAGNOSIS — E06.3 HASHIMOTO'S DISEASE: ICD-10-CM

## 2024-01-23 DIAGNOSIS — R73.03 PREDIABETES: ICD-10-CM

## 2024-01-23 DIAGNOSIS — E22.2 SIADH (SYNDROME OF INAPPROPRIATE ADH PRODUCTION) (HCC): ICD-10-CM

## 2024-01-23 PROCEDURE — 3074F SYST BP LT 130 MM HG: CPT | Performed by: INTERNAL MEDICINE

## 2024-01-23 PROCEDURE — 3078F DIAST BP <80 MM HG: CPT | Performed by: INTERNAL MEDICINE

## 2024-01-23 PROCEDURE — 99214 OFFICE O/P EST MOD 30 MIN: CPT | Performed by: INTERNAL MEDICINE

## 2024-01-23 PROCEDURE — 99211 OFF/OP EST MAY X REQ PHY/QHP: CPT | Performed by: INTERNAL MEDICINE

## 2024-01-23 ASSESSMENT — FIBROSIS 4 INDEX: FIB4 SCORE: 1.71

## 2024-01-23 NOTE — PROGRESS NOTES
Chief Complaint: Follow up for Chronic hyponatremia due to SIADH    HPI:     Yuly Gotti is a 73 y.o. female here for follow up of the [place] medical issue    She has long standing hyponatremia with work up ruling out Chetan's disease (21 OH lase abs negative ACTH and cortisol was normal and TSH and free T4 was normal)  Incidentally she had elevated TPO antibodies of 350        She denies hydrochlorothiazide use  She denies use of illicit substances such as ecstasy  She denies history of chronic kidney disease, cirrhosis and congestive heart failure  She admits that she is from [place] and she does not like eating a lot of salt  She claimed that she was drinking 3 L/day  After her initial visit I placed her on water restriction she was supposed to see me again after 3 months but she did not follow-up until after 1 year        On follow up she is following the water restriction but she can't comply with atrict water restriction (< 1L of free water daily)  She has started taking salt tab 1000mg  1 tab once a week      She denies headaches   She denies falls and fractures  She denies vomiting  She denies frequent diarrhea     Incidentally she also has prediabetes  Her A1c is 5.9% on 7/2023     Latest Reference Range & Units 01/17/24 08:35   Sodium 135 - 145 mmol/L 133 (L)   Potassium 3.6 - 5.5 mmol/L 4.4   Chloride 96 - 112 mmol/L 96   Co2 20 - 33 mmol/L 28   Anion Gap 7.0 - 16.0  9.0   Glucose 65 - 99 mg/dL 97   Bun 8 - 22 mg/dL 11   Creatinine 0.50 - 1.40 mg/dL 0.37 (L)   GFR (CKD-EPI) >60 mL/min/1.73 m 2 105        Latest Reference Range & Units 07/12/23 11:19   TSH 0.380 - 5.330 uIU/mL 0.740   Free T-4 0.93 - 1.70 ng/dL 1.18         Patient's medications, allergies, and social histories were reviewed and updated as appropriate.      ROS:     CONS:     No fever, no chills   EYES:     No diplopia, no blurry vision   CV:           No chest pain, no palpitations   PULM:     No SOB, no cough, no hemoptysis.   GI:           "  No nausea, no vomiting, no diarrhea, no constipation   ENDO:     No polyuria, no polydipsia, no heat intolerance, no cold intolerance       Past Medical History:  Problem List:  2022-08: Hashimoto's disease  2022-08: SIADH (syndrome of inappropriate ADH production) (HCC)  2022-08: Blood in the stool  2022-05: Upper back pain on right side  2021-10: Anti-TPO antibodies present  2021-04: H. pylori infection  2021-01: Mixed hyperlipidemia  2020-08: Rash  2020-06: Hyponatremia  2020-03: Age-related osteoporosis without current pathological   fracture  2019-04: Injury of left radial artery  2018-09: High risk for hip fracture  2017-02: Urticaria  2016-11: Chronic cervical radiculopathy  2016-11: Gastroesophageal reflux disease without esophagitis  2016-05: Insomnia  2016-05: Tobacco dependence      Past Surgical History:  Past Surgical History:   Procedure Laterality Date    ARTERY EXPLORATION OR REPAIR Left 4/3/2019    Procedure: RADIAL ARTERY REPAIR;  Surgeon: Wally Colbert M.D.;  Location: SURGERY [facility];  Service: General    TENDON REPAIR Left 4/3/2019    Procedure: REPAIR, TENDON;  Surgeon: Federico Saenz M.D.;  Location: SURGERY [facility];  Service: Orthopedics        Allergies:  Patient has no known allergies.     Social History:  Social History     Tobacco Use    Smoking status: Every Day     Current packs/day: 0.25     Types: Cigarettes    Smokeless tobacco: Never   Vaping Use    Vaping Use: Never used   Substance Use Topics    Alcohol use: Yes     Alcohol/week: 0.6 oz     Types: 1 Glasses of wine per week    Drug use: Never        Family History:   family history includes Cancer in her sister.      PHYSICAL EXAM:   Vital signs: /64 (BP Location: Left arm, Patient Position: Sitting, BP Cuff Size: Adult)   Pulse 95   Ht 1.575 m (5' 2\")   Wt 51.7 kg (114 lb)   LMP 01/01/1989   SpO2 94%   BMI 20.85 kg/m²   GENERAL: Well-developed, well-nourished in no apparent distress.   EYE:  No " ocular asymmetry, PERRLA  HENT: Pink, moist mucous membranes.    NECK: No thyromegaly.   CARDIOVASCULAR:  No murmurs  LUNGS: Clear breath sounds  ABDOMEN: Soft, nontender   EXTREMITIES: No clubbing, cyanosis, or edema.   NEUROLOGICAL: No gross focal motor abnormalities   LYMPH: No cervical adenopathy palpated.   SKIN: No rashes, lesions.       Labs:  Lab Results   Component Value Date/Time    SODIUM 133 (L) 01/17/2024 08:35 AM    POTASSIUM 4.4 01/17/2024 08:35 AM    CHLORIDE 96 01/17/2024 08:35 AM    CO2 28 01/17/2024 08:35 AM    ANION 9.0 01/17/2024 08:35 AM    GLUCOSE 97 01/17/2024 08:35 AM    BUN 11 01/17/2024 08:35 AM    CREATININE 0.37 (L) 01/17/2024 08:35 AM    CALCIUM 8.9 01/17/2024 08:35 AM    ASTSGOT 24 01/17/2024 08:35 AM    ALTSGPT 19 01/17/2024 08:35 AM    TBILIRUBIN 0.5 01/17/2024 08:35 AM    ALBUMIN 4.2 01/17/2024 08:35 AM    TOTPROTEIN 7.0 01/17/2024 08:35 AM    GLOBULIN 2.8 01/17/2024 08:35 AM    AGRATIO 1.5 01/17/2024 08:35 AM       Lab Results   Component Value Date/Time    SODIUM 130 (L) 08/10/2022 1151    POTASSIUM 4.1 08/10/2022 1151    CHLORIDE 95 (L) 08/10/2022 1151    CO2 24 08/10/2022 1151    GLUCOSE 88 08/10/2022 1151    BUN 8 08/10/2022 1151    CREATININE 0.37 (L) 08/10/2022 1151    CALCIUM 9.2 08/10/2022 1151    ANION 11.0 08/10/2022 1151       Lab Results   Component Value Date/Time    CHOLSTRLTOT 108 08/10/2022 1151    TRIGLYCERIDE 69 08/10/2022 1151    HDL 36 (A) 08/10/2022 1151    LDL 58 08/10/2022 1151       Lab Results   Component Value Date/Time    TSHULTRASEN 2.140 07/21/2021 0903     Lab Results   Component Value Date/Time    FREET4 1.16 07/21/2021 0903     No results found for: FREET3  No results found for: THYSTIMIG    Lab Results   Component Value Date/Time    MICROSOMALA 357.7 (H) 07/21/2021 0903         Imaging:      ASSESSMENT/PLAN:     1. Hyponatremia  Stable  Sodium is better  Continue water restriction  Reminded patient to get labs done prior to next visit  I will see  her in 7 months    2. SIADH (syndrome of inappropriate ADH production) (HCC)  This is the likely etiology of her hyponatremia  She is on water restriction   I recommend conservative management with fluid restriction  We will continue to monitor her sodium levels    4. Hashimoto's disease  Stsable   She is euthyroid  Reviewed the risk of developing hypothyroidism  At this time recommend annual monitoring of TSH    4. Prediabetes  Reviewed risk of type 2 diabetes  Recommend diet, exercise and lifestyle changes  Recommend monitoring A1c with upcoming labs    Return in about 7 months (around 8/23/2024).      Thank you kindly for allowing me to participate in the thyroid care plan for this patient.    Constantin Romero MD, FACE, [facility]      CC:   Darwin Paulson M.D.

## 2024-01-25 ENCOUNTER — OFFICE VISIT (OUTPATIENT)
Dept: MEDICAL GROUP | Facility: PHYSICIAN GROUP | Age: 74
End: 2024-01-25
Payer: MEDICARE

## 2024-01-25 VITALS
TEMPERATURE: 98.6 F | DIASTOLIC BLOOD PRESSURE: 68 MMHG | WEIGHT: 114 LBS | BODY MASS INDEX: 20.98 KG/M2 | OXYGEN SATURATION: 96 % | SYSTOLIC BLOOD PRESSURE: 114 MMHG | HEIGHT: 62 IN | HEART RATE: 77 BPM

## 2024-01-25 DIAGNOSIS — K21.9 GASTROESOPHAGEAL REFLUX DISEASE WITHOUT ESOPHAGITIS: ICD-10-CM

## 2024-01-25 DIAGNOSIS — E87.1 HYPONATREMIA: ICD-10-CM

## 2024-01-25 DIAGNOSIS — E78.2 MIXED HYPERLIPIDEMIA: ICD-10-CM

## 2024-01-25 DIAGNOSIS — E06.3 HASHIMOTO'S DISEASE: ICD-10-CM

## 2024-01-25 DIAGNOSIS — Z71.1 CONCERN ABOUT SKIN DISEASE WITHOUT DIAGNOSIS: ICD-10-CM

## 2024-01-25 DIAGNOSIS — R73.03 PREDIABETES: ICD-10-CM

## 2024-01-25 DIAGNOSIS — M54.12 CHRONIC CERVICAL RADICULOPATHY: Primary | ICD-10-CM

## 2024-01-25 DIAGNOSIS — F17.200 TOBACCO DEPENDENCE: ICD-10-CM

## 2024-01-25 PROBLEM — A04.8 H. PYLORI INFECTION: Status: RESOLVED | Noted: 2021-04-27 | Resolved: 2024-01-25

## 2024-01-25 PROCEDURE — 99214 OFFICE O/P EST MOD 30 MIN: CPT | Performed by: NURSE PRACTITIONER

## 2024-01-25 PROCEDURE — 3074F SYST BP LT 130 MM HG: CPT | Performed by: NURSE PRACTITIONER

## 2024-01-25 PROCEDURE — 3078F DIAST BP <80 MM HG: CPT | Performed by: NURSE PRACTITIONER

## 2024-01-25 ASSESSMENT — ENCOUNTER SYMPTOMS
FEVER: 0
WEIGHT LOSS: 0
CHILLS: 0
SHORTNESS OF BREATH: 0
HEADACHES: 0
DIZZINESS: 0

## 2024-01-25 ASSESSMENT — PATIENT HEALTH QUESTIONNAIRE - PHQ9: CLINICAL INTERPRETATION OF PHQ2 SCORE: 0

## 2024-01-25 ASSESSMENT — FIBROSIS 4 INDEX: FIB4 SCORE: 1.71

## 2024-01-25 NOTE — PROGRESS NOTES
Subjective:     CC: Establish care, L shoulder pain, referral to dermatology    HPI:   HAROON is a 73 y.o. female presents to establish care. Previous PCP was Dr. Paulson. Specialists: Endocrinology. Pt would like to discuss the following today:    Problem   Prediabetes    This is chronic. She has been avoiding sweets. She has reduced her intake of rice and increased her intake of veggies.   Last A1C: 5.9% (07/2023)     Hashimoto's Disease    She had elevated TPO antibodies of 357, but her TSH has been normal.     Mixed Hyperlipidemia    This is chronic.   Current regimen: Rosuvastatin 5mg nightly  Last Lipid panel:  Lab Results   Component Value Date/Time    CHOLSTRLTOT 101 07/12/2023 11:21 AM    LDL 52 07/12/2023 11:21 AM    HDL 36 (A) 07/12/2023 11:21 AM    TRIGLYCERIDE 67 07/12/2023 11:21 AM      Hyponatremia    This is chronic. She is followed by Endocrinology. She had work up ruling out Stella's disease (21 OH lase abs negative ACTH and cortisol was normal and TSH and free T4 was normal). Suspected to have SIADH and advised to limit fluid intake. She has been decreasing from 3L to 2L per day.      Chronic Cervical Radiculopathy    Onset: 20 years ago, acute on chronic (worsening over the past year)  Her symptoms are intermittent. Reports some tingling in her left upper extremity. Pain radiates from her cervical neck to her L forearm. She recently moved, about 2 weeks ago, and is unsure if in the process of moving, she may have exacerbated her symptoms.   States that when she sleeps well, her symptoms are well controlled.   In the past, she did tried physical therapy, with good success.  Treatments tried: heat therapy with good success  Denies weakness or numbness.      Gastroesophageal Reflux Disease Without Esophagitis    This is chronic. She takes Pantoprazole only as needed for heartburn, about 1-2x/week.      Tobacco Dependence    This is chronic. She has been smoking for 40 years. She currently smokes  2-3 cigarettes daily. She has been able to quit in the past and she is not interested in quitting completely.      H. Pylori Infection (Resolved)      Health Maintenance/Immunizations: Completed    Current Outpatient Medications   Medication Sig Dispense Refill    rosuvastatin (CRESTOR) 5 MG Tab TAKE 1 TABLET BY MOUTH EVERY DAY IN THE EVENING 100 Tablet 3    aspirin (ASA) 81 MG Chew Tab chewable tablet Take 81 mg by mouth every day.      Omega-3 Fatty Acids (FISH OIL) 1000 MG Cap capsule Take 1,000 mg by mouth every day.      pantoprazole (PROTONIX) 40 MG Tablet Delayed Response TAKE 1 TABLET BY MOUTH EVERY DAY (Patient not taking: Reported on 1/25/2024) 90 Tablet 3     No current facility-administered medications for this visit.     Past Medical History:   Diagnosis Date    Cervical radiculopathy     Foot pain, left     H. pylori infection 04/27/2021    High risk sexual behavior     Insomnia, psychophysiological     Menopause     Tobacco dependence     UTI (urinary tract infection)      Past Surgical History:   Procedure Laterality Date    ARTERY EXPLORATION OR REPAIR Left 4/3/2019    Procedure: RADIAL ARTERY REPAIR;  Surgeon: Wally Colbert M.D.;  Location: SURGERY [facility];  Service: General    TENDON REPAIR Left 4/3/2019    Procedure: REPAIR, TENDON;  Surgeon: Federico Saenz M.D.;  Location: SURGERY [facility];  Service: Orthopedics      Family History   Problem Relation Age of Onset    Cancer Sister      Social History     Tobacco Use    Smoking status: Every Day     Current packs/day: 0.25     Types: Cigarettes    Smokeless tobacco: Never   Vaping Use    Vaping Use: Never used   Substance Use Topics    Alcohol use: Yes     Alcohol/week: 0.6 oz     Types: 1 Glasses of wine per week    Drug use: Never      Review of Systems   Constitutional:  Negative for chills, fever and weight loss.   Respiratory:  Negative for shortness of breath.    Cardiovascular:  Negative for chest pain.   Skin:   "Negative for rash.   Neurological:  Negative for dizziness and headaches.      Objective:     /68 (BP Location: Left arm, Patient Position: Sitting, BP Cuff Size: Adult)   Pulse 77   Temp 37 °C (98.6 °F) (Temporal)   Ht 1.575 m (5' 2\")   Wt 51.7 kg (114 lb)   LMP 01/01/1989   SpO2 96%   BMI 20.85 kg/m²  Body mass index is 20.85 kg/m².     Wt Readings from Last 4 Encounters:   01/25/24 51.7 kg (114 lb)   01/23/24 51.7 kg (114 lb)   07/11/23 50.2 kg (110 lb 9.6 oz)   03/02/23 51.1 kg (112 lb 9.6 oz)     Physical Exam  Constitutional:       Appearance: Normal appearance.   Eyes:      Conjunctiva/sclera: Conjunctivae normal.      Pupils: Pupils are equal, round, and reactive to light.   Cardiovascular:      Rate and Rhythm: Normal rate and regular rhythm.      Heart sounds: Normal heart sounds. No murmur heard.  Pulmonary:      Effort: Pulmonary effort is normal. No respiratory distress.      Breath sounds: Normal breath sounds.   Musculoskeletal:      Right lower leg: No edema.      Left lower leg: No edema.   Lymphadenopathy:      Cervical: No cervical adenopathy.   Skin:     General: Skin is warm and dry.   Neurological:      General: No focal deficit present.      Mental Status: She is alert and oriented to person, place, and time.   Psychiatric:         Mood and Affect: Mood normal.         Behavior: Behavior normal.        Assessment and Plan:   73 y.o. female with the following -    1. Chronic cervical radiculopathy  Acute on chronic. No red flags. Will evaluate with cervical x-ray. I also recommended physical therapy, which she was agreeable to. Continue with heat therapy.   - DX-CERVICAL SPINE-2 OR 3 VIEWS; Future  - Referral to Physical Therapy    2. Mixed hyperlipidemia  Chronic, stable. Last LDL at goal. Continue current regimen.  - Rosuvastatin 5mg; 1 tablet by mouth daily    3. Prediabetes  Chronic. Last A1C at 5.9%. Will monitor annually. Reinforced the Mediterranean diet.     4. " Gastroesophageal reflux disease without esophagitis  Chronic, stable. Continue current regimen.  - Pantoprazole 40mg; 1 tablet by mouth daily as needed for heartburn    5. Hyponatremia  Continue follow up with Endocrinology. Reinforced limiting fluid intake.     6. Hashimoto's disease  Chronic, most recent TSH within normal limits. Recommended continued follow up with Endocrinology.    7. Tobacco dependence  Chronic, recommended smoking cessation.     8. Concern about skin disease without diagnosis  - Referral to Dermatology    Return in about 6 months (around 7/25/2024) for Chronic disease management .    Please note that this dictation was created using voice recognition software. I have made every reasonable attempt to correct obvious errors, but I expect that there are errors of grammar and possibly content that I did not discover before finalizing the note.

## 2024-02-02 ENCOUNTER — HOSPITAL ENCOUNTER (OUTPATIENT)
Dept: RADIOLOGY | Facility: MEDICAL CENTER | Age: 74
End: 2024-02-02
Attending: NURSE PRACTITIONER
Payer: MEDICARE

## 2024-02-02 DIAGNOSIS — M54.12 CHRONIC CERVICAL RADICULOPATHY: ICD-10-CM

## 2024-02-02 PROCEDURE — 72040 X-RAY EXAM NECK SPINE 2-3 VW: CPT

## 2024-02-05 DIAGNOSIS — K21.9 GASTROESOPHAGEAL REFLUX DISEASE WITHOUT ESOPHAGITIS: ICD-10-CM

## 2024-02-05 RX ORDER — PANTOPRAZOLE SODIUM 40 MG/1
40 TABLET, DELAYED RELEASE ORAL DAILY
Qty: 90 TABLET | Refills: 0 | Status: SHIPPED | OUTPATIENT
Start: 2024-02-05

## 2024-02-05 NOTE — TELEPHONE ENCOUNTER
Received request via: Pharmacy    Was the patient seen in the last year in this department? Yes    Does the patient have an active prescription (recently filled or refills available) for medication(s) requested? No    Pharmacy Name: [facility]    Does the patient have [facility] Plus and need 100 day supply (blood pressure, diabetes and cholesterol meds only)? Medication is not for cholesterol, blood pressure or diabetes

## 2024-04-02 ENCOUNTER — OFFICE VISIT (OUTPATIENT)
Dept: DERMATOLOGY | Facility: IMAGING CENTER | Age: 74
End: 2024-04-02
Payer: MEDICARE

## 2024-04-02 DIAGNOSIS — L82.1 SK (SEBORRHEIC KERATOSIS): ICD-10-CM

## 2024-04-02 DIAGNOSIS — L29.9 ITCHY SCALP: ICD-10-CM

## 2024-04-02 DIAGNOSIS — L30.9 DERMATITIS: ICD-10-CM

## 2024-04-02 PROCEDURE — 99213 OFFICE O/P EST LOW 20 MIN: CPT | Performed by: NURSE PRACTITIONER

## 2024-04-02 RX ORDER — TRIAMCINOLONE ACETONIDE 1 MG/G
1 OINTMENT TOPICAL 2 TIMES DAILY
COMMUNITY
Start: 2024-02-19

## 2024-04-02 RX ORDER — KETOCONAZOLE 20 MG/ML
SHAMPOO TOPICAL
Qty: 120 ML | Refills: 3 | Status: SHIPPED | OUTPATIENT
Start: 2024-04-02

## 2024-04-02 NOTE — PROGRESS NOTES
DERMATOLOGY NOTE  NEW VISIT       Chief complaint: Establish Care   Rash   Discuss itchy scalp , dry areas and pimples . Also foot itchy ness, rash behind left ankle x 2 months     Has tried triamcinolone on hands , behind ankle . Some improvement per patient     History of skin cancer: No  History of precancers/actinic keratoses: No  History of biopsies:No  History of blistering/severe sunburns:Yes, Details:    Family history of skin cancer:No  Family history of atypical moles:No      No Known Allergies     MEDICATIONS:  Medications relevant to specialty reviewed.     REVIEW OF SYSTEMS:   Positive for skin (see HPI)  Negative for fevers and chills       EXAM:  LMP 01/01/1989   Constitutional: Well-developed, well-nourished, and in no distress.     A focused skin exam was performed including the affected areas of the scalp, face and BLEs. Notable findings on exam today listed below and/or in assessment/plan.     Very mild erythema, flake and scale to posterior scalp  Localized eczematous patch to posterior aspect of LLE  Tan waxy stuck on appearing papule to L groin    IMPRESSION / PLAN:    1. Itchy scalp  Presumed seb derm  Will trial Rx below    - ketoconazole (NIZORAL) 2 % shampoo; Use daily for 1-2 weeks until resolved, then can use 2-3 times per week for maintenancy  Dispense: 120 mL; Refill: 3    2. Dermatitis  AD vs contact allergic dermatitis  Has TAC on hand, advised use BID for 2-3 weeks at a time    3. SK (seborrheic keratosis)  - Benign-appearing nature of lesions discussed during exam.   - Advised to continue to monitor for any return to clinic for new or concerning changes.        Please note that this dictation was created using voice recognition software. I have made every reasonable attempt to correct obvious errors, but I expect that there are errors of grammar and possibly content that I did not discover before finalizing the note.      Return to clinic in: Return for PRN, KRISTIE at pt's convenience.  and as needed for any new or changing skin lesions.

## 2024-04-15 ENCOUNTER — OFFICE VISIT (OUTPATIENT)
Dept: DERMATOLOGY | Facility: IMAGING CENTER | Age: 74
End: 2024-04-15
Payer: MEDICARE

## 2024-04-15 DIAGNOSIS — D18.01 CHERRY ANGIOMA: ICD-10-CM

## 2024-04-15 DIAGNOSIS — D22.9 MULTIPLE NEVI: ICD-10-CM

## 2024-04-15 DIAGNOSIS — Z12.83 SKIN CANCER SCREENING: ICD-10-CM

## 2024-04-15 DIAGNOSIS — L82.1 SK (SEBORRHEIC KERATOSIS): ICD-10-CM

## 2024-04-15 DIAGNOSIS — L81.4 LENTIGINES: ICD-10-CM

## 2024-04-15 DIAGNOSIS — B07.9 VERRUCAS: ICD-10-CM

## 2024-04-15 PROCEDURE — 17110 DESTRUCTION B9 LES UP TO 14: CPT | Performed by: NURSE PRACTITIONER

## 2024-04-15 PROCEDURE — 99213 OFFICE O/P EST LOW 20 MIN: CPT | Mod: 25 | Performed by: NURSE PRACTITIONER

## 2024-04-15 NOTE — PROGRESS NOTES
DERMATOLOGY NOTE  FOLLOW UP VISIT       Chief complaint: Follow-Up (Initial KRISTIE)    Denies new, growing, changing, itching or bleeding skin lesions today.    Pt reports scalp some slight improvement still itchy.     History of skin cancer: No  History of precancers/actinic keratoses: No  History of biopsies:No  History of blistering/severe sunburns:Yes, Details:    Family history of skin cancer:No  Family history of atypical moles:No    No Known Allergies     MEDICATIONS:  Medications relevant to specialty reviewed.     REVIEW OF SYSTEMS:   Positive for skin (see HPI)  Negative for fevers and chills    EXAM:  LMP 01/01/1989   Constitutional: Well-developed, well-nourished, and in no distress.     A total body skin exam was performed excluding the genitals per patient preference and including the following areas: head (including face), neck, chest, abdomen, groin/buttocks, back, bilateral upper extremities, and bilateral lower extremities with the following pertinent findings listed below and/or in assessment/plan.     -sun exposed skin of trunk and b/l upper, lower extremities and face with scattered clinically benign light brown reticulated macules all of which were morphologically similar and none of which were suspicious for skin cancer today on exam  -Several scattered 1-3mm bright red macules and thin papules on the trunk and extremities  -Multiple tan medium brown skin-colored macules papules scattered over the trunk >> extremities--All with benign-appearing pigment network patterns on dermoscopy  -Several tan stuck-on waxy papules scattered on the trunk and extremities  Verruca volar aspect of L thumb      IMPRESSION / PLAN:    1. Lentigines  - Benign-appearing nature of lesions discussed during exam.   - Advised to continue to monitor for any return to clinic for new or concerning changes.      2. Cherry angioma  - Benign-appearing nature of lesions discussed during exam.   - Advised to continue to monitor  for any return to clinic for new or concerning changes.      3. Multiple nevi  - Benign-appearing nature of lesions discussed during exam.   - Advised to continue to monitor for any return to clinic for new or concerning changes.  - ABCDE's of melanoma discussed/handout given      4. SK (seborrheic keratosis)  - Benign-appearing nature of lesions discussed during exam.   - Advised to continue to monitor for any return to clinic for new or concerning changes.      5. Verrucas  CRYOTHERAPY:  Risks (including, but not limited to: skin discoloration, redness, blister, blood blister, recurrence, need for further treatment, infection, scar) and benefits of cryotherapy discussed. Patient verbally agreed to proceed with treatment. 1 cryotherapy freeze thaw cycles of 10 seconds were applied to 1 lesion on L hand as noted on exam with cryac. Patient tolerated procedure well. Aftercare instructions given--no specific care needed unless irritated during healing process, can apply Vaseline with small band-aid if needed.  Advised in 1 week to start applying wart removal pads daily until resolved    6. Skin cancer screening  Skin cancer education  discussed importance of sun protective clothing, eyewear in addition to the use of broad spectrum sunscreen with SPF 30 or greater, as well as need for reapplication ~every 2 hours when exposed to UVR/handout given  discussed importance following up for any new or changing lesions as noted in handout given, but every 12 months exams in clinic in the setting of dermatologic history  ABCDE's of melanoma discussed/handout given        Please note that this dictation was created using voice recognition software. I have made every reasonable attempt to correct obvious errors, but I expect that there are errors of grammar and possibly content that I did not discover before finalizing the note.      Return to clinic in: Return in about 1 year (around 4/15/2025) for KRISTIE. and as needed for any  new or changing skin lesions.

## 2024-05-09 ENCOUNTER — TELEPHONE (OUTPATIENT)
Dept: MEDICAL GROUP | Facility: PHYSICIAN GROUP | Age: 74
End: 2024-05-09
Payer: MEDICARE

## 2024-05-09 DIAGNOSIS — M54.12 CHRONIC CERVICAL RADICULOPATHY: ICD-10-CM

## 2024-05-09 NOTE — TELEPHONE ENCOUNTER
Louie Martines, patient Yuly Gotti has her first Physical Therapy appointment on May 13th. Her referral has  per our 90 day policy. If you could please submit a new PT referral at your earliest convenience with same diagnosis codes: M54.12 (ICD-10-CM) - Chronic cervical radiculopathy and same frequency and duration. We will attach referral to her appointment.    Please advise.   Alisha from [facility] left as message.

## 2024-05-10 NOTE — TELEPHONE ENCOUNTER
New referral to physical therapy initiated.    Thank you,    GRACE Agee  [facility]

## 2024-05-13 ENCOUNTER — PHYSICAL THERAPY (OUTPATIENT)
Dept: PHYSICAL THERAPY | Facility: REHABILITATION | Age: 74
End: 2024-05-13
Attending: NURSE PRACTITIONER
Payer: MEDICARE

## 2024-05-13 DIAGNOSIS — M54.12 CHRONIC CERVICAL RADICULOPATHY: ICD-10-CM

## 2024-05-13 ASSESSMENT — ENCOUNTER SYMPTOMS
PAIN SCALE: 7
PAIN SCALE AT HIGHEST: 8
PAIN SCALE AT LOWEST: 2

## 2024-05-13 NOTE — OP THERAPY EVALUATION
Outpatient Physical Therapy  INITIAL EVALUATION    [facility] Physical Therapy [address].  [address]  [place] [address]  Phone:  [phone]  Fax:  [phone]    Date of Evaluation: 2024    Patient: Yuly Gotti  YOB: 1950  MRN: [id number]     Referring Provider: NUP[address]  [place] [address]   Referring Diagnosis Chronic cervical radiculopathy [M54.12]     Time Calculation  Start time: 1115  Stop time: 1204 Time Calculation (min): 49 minutes         Chief Complaint: Neck Pain    Visit Diagnoses     ICD-10-CM   1. Chronic cervical radiculopathy  M54.12       Date of onset of impairment: 2019    Subjective:   History of Present Illness:     Mechanism of injury:  [employer] : Gabriel [id number]    Pt is a 74 yo female presenting to PT with c/o neck pain with radiating pain in the LUE. Pt reports NT that travels from the neck to the wrist. Reports worst pain when looking up. Will also have intermittent dizziness when getting out of bed. Denies onset with c/s rotation. Reports significant difficulty lifting > 5# w/o pain. Enjoys walking for exercise, which decreases symptoms.    Aggs: Looking up, sitting >30 minutes, lifting > 5#  Easers: Warm weather  Irritability: moderate   Sleep disturbance:  Interrupted sleep (1-2x per week)  Pain:     Current pain ratin    At best pain ratin    At worst pain ratin  Hand dominance:  Right  Activities of Daily Living:     Patient reported ADL status: Independent with ADLs, but requires increased time to complete tasks  Patient Goals:     Patient goals for therapy:  Decreased pain and increased motion      Past Medical History:   Diagnosis Date    Cervical radiculopathy     Foot pain, left     H. pylori infection 2021    High risk sexual behavior     Insomnia, psychophysiological     Menopause     Tobacco dependence     UTI (urinary tract infection)      Past  "Surgical History:   Procedure Laterality Date    ARTERY EXPLORATION OR REPAIR Left 4/3/2019    Procedure: RADIAL ARTERY REPAIR;  Surgeon: Wally Colbert M.D.;  Location: SURGERY [facility];  Service: General    TENDON REPAIR Left 4/3/2019    Procedure: REPAIR, TENDON;  Surgeon: Federico Saenz M.D.;  Location: SURGERY [facility];  Service: Orthopedics     Social History     Tobacco Use    Smoking status: Every Day     Current packs/day: 0.25     Types: Cigarettes    Smokeless tobacco: Never   Substance Use Topics    Alcohol use: Yes     Alcohol/week: 0.6 oz     Types: 1 Glasses of wine per week     Family and Occupational History     Socioeconomic History    Marital status: Single     Spouse name: Not on file    Number of children: Not on file    Years of education: Not on file    Highest education level: Not on file   Occupational History    Not on file       Objective     Observations   Central spine     Positive for forward head/neck.    Shoulder Screen    Shoulder active range of motion within functional limits.    Neurological Testing     Dermatome testing   Cervical (left)   C3 (lateral neck): intact  C4 (top of AC joint): intact  C5 (lateral deltoid): intact  C6 (thumb): intact  C7 (middle finger): intact  C8 (little finger): decreased  T1 (ulnar antecubital): decreased    Cervical (right)   C3 (lateral neck): intact  C4 (top of AC joint): intact  C5 (lateral deltoid): intact  C6 (thumb): intact  C7 (middle finger): intact  C8 (little finger): intact  T1 (ulnar antecubital): intact    Palpation   Left   Hypertonic in the upper trapezius.   Tenderness of the upper trapezius.     Active Range of Motion     Cervical Spine   Flexion: within functional limits (\"tight\")  Extension: decreased (Painful)  Left rotation: decreased  Right rotation: decreased    Additional Active Range of Motion Details  L rot: 40 dg  R rot: 54 dg    Joint Play   Spine     Central PA [place]        C2: hypomobile       C3: " "hypomobile and painful       C4: hypomobile       C5: hypomobile       C6: hypomobile       C7: hypomobile and painful       C8: hypomobile and painful      Strength:      Additional Strength Details  DNF endurance: 7 seconds    Tests   Cervical spine   Positive cervical spine distraction.    Left Shoulder   Positive ULTT2.         Therapeutic Exercises (CPT 26573):     1. Chin tuck w/ lift, to fatigue    2. Seated chin tuck, 5x10\" hold    3. Median nerve glide, x10 ea      Therapeutic Exercise Summary: HEP:  Chin tuck, median nerve glide      Time-based treatments/modalities:    Physical Therapy Timed Treatment Charges  Therapeutic exercise minutes (CPT 72416): 10 minutes      Assessment, Response and Plan:   Impairments: abnormal or restricted ROM, activity intolerance, impaired physical strength and lacks appropriate home exercise program    Assessment details:  Pt is a 74 yo cis-female presenting to PT w/ clinical findings suggestive of cervical radiculopathy. Pertinent clinical findings include decreased and painful c/s ROM with noted segmental hypomobility. Demo positive provocation with neurodynamic testing, as well as poor DNF endurance. Impairments are associated w/ difficulty performing ADLs. The patient would benefit from skilled PT to address strength and ROM deficits in order to increase participation with daily activities. The patient states they understand and agree with the plan of care. HEP w/ handout was reviewed and provided to the pt.   Barriers to therapy:  None  Prognosis: good    Goals:   Short Term Goals:   1. Pt will be compliant with HEP 3-5x per week for improving ROM, strength and decreasing pain  2. Pt will report ability to sit 30 minutes with neck pain no greater than 2/10 in order to complete daily tasks  Short term goal time span:  2-4 weeks      Long Term Goals:    1. Pt will demonstrate improved DNF endurance with 20 second improvement or better from assessment  2. Pt will demo c/s " ROM WFL in order to complete ADLs  3. Pt will demo ability to lift 10# at waist level without pain in order to complete daily activities  Long term goal time span:  4-6 weeks    Plan:   Therapy options:  Physical therapy treatment to continue  Planned therapy interventions:  E Stim Unattended (CPT 75289), Manual Therapy (CPT 55829), Mechanical Traction (CPT 81385), Neuromuscular Re-education (CPT 72383), Self Care ADL Training (CPT 22426), Therapeutic Activities (CPT 03514) and Therapeutic Exercise (CPT 88401)  Frequency:  2x week  Duration in visits:  8  Discussed with:  Patient      Functional Assessment Used  Neck Disability Total: 28     Referring provider co-signature:  I have reviewed this plan of care and my co-signature certifies the need for services.    Certification Period: 05/13/2024 to  07/08/24    Physician Signature: ________________________________ Date: ______________

## 2024-05-21 ENCOUNTER — PHYSICAL THERAPY (OUTPATIENT)
Dept: PHYSICAL THERAPY | Facility: REHABILITATION | Age: 74
End: 2024-05-21
Attending: NURSE PRACTITIONER
Payer: MEDICARE

## 2024-05-21 DIAGNOSIS — M54.12 CHRONIC CERVICAL RADICULOPATHY: ICD-10-CM

## 2024-05-21 NOTE — OP THERAPY DAILY TREATMENT
"  Outpatient Physical Therapy  DAILY TREATMENT     [facility] Physical [address].  [address]  [place] [address]  Phone:  [phone]  Fax:  [phone]    Date: 05/21/2024    Patient: Yuly Gotti  YOB: 1950  MRN: [id number]     Time Calculation    Start time: 1330  Stop time: 1411 Time Calculation (min): 41 minutes         Chief Complaint: Neck Pain    Visit #: 2    SUBJECTIVE:  LanguageLine : declined    Pt reports neck pain is overall improved, but continues to be constant. Also reports continued radiating pain in the LUE.    OBJECTIVE:  Current objective measures:     C/s ROM:  Ext: decreased, painful  Flex: decreased, painful   L rot: painful L side  R rot: painful L side    Reports ext>flex          Therapeutic Exercises (CPT 15902):     1. Lower c/s extension snag, 2x7    2. rotation snag, x5 ea    3. UT stretch, x30\"    4. LS stretch, painful    5. Seated chin tuck, 3x30\" hold    6. Wall Ys, 2x20, w/ cueing for chin tuck    20. POC 5/13/24 to 7/8/24      Therapeutic Exercise Summary: HEP:  Chin tuck, median nerve glide    Therapeutic Treatments and Modalities:     1. Manual Therapy (CPT 75108)    Therapeutic Treatment and Modalities Summary: Upglides bilaterally  C3-C7, gd  UT STM/trigger point  LS STM/trigger point  Suboccipital distraction       Time-based treatments/modalities:    Physical Therapy Timed Treatment Charges  Manual therapy minutes (CPT 64555): 18 minutes  Therapeutic exercise minutes (CPT 99402): 23 minutes        ASSESSMENT:   Response to treatment: Pt demo positive response to manual and self mobilizations with noticeably improved rotation and extension ROM. Pt also demo overall decreased pain by end of session. Progressed HEP to include new strength and mobility exercises, handout provided. Pt is progressing at this time and appropriate to cont PT.    PLAN/RECOMMENDATIONS:   Plan for treatment: therapy treatment to continue next " visit.  Planned interventions for next visit: continue with current treatment.

## 2024-05-28 ENCOUNTER — PHYSICAL THERAPY (OUTPATIENT)
Dept: PHYSICAL THERAPY | Facility: REHABILITATION | Age: 74
End: 2024-05-28
Attending: NURSE PRACTITIONER
Payer: MEDICARE

## 2024-05-28 DIAGNOSIS — M54.12 CHRONIC CERVICAL RADICULOPATHY: ICD-10-CM

## 2024-05-28 NOTE — OP THERAPY DAILY TREATMENT
"  Outpatient Physical Therapy  DAILY TREATMENT     [facility] Physical [address].  [address]  [place] [address]  Phone:  [phone]  Fax:  [phone]    Date: 05/28/2024    Patient: Yuly Gotti  YOB: 1950  MRN: [id number]     Time Calculation    Start time: 1030  Stop time: 1113 Time Calculation (min): 43 minutes         Chief Complaint: Neck Pain    Visit #: 3    SUBJECTIVE:  Pt reports she had soreness/ pain in the lower traps from HEP. Pt  reports she has no numbness in the arms or hands anymore.     OBJECTIVE:  Current objective measures:   C/s ROM:  Ext: decreased, painful  Flex: decreased,  L rot: tight L side  R rot: tight L side          Therapeutic Exercises (CPT 15778):     1. Lower c/s extension snag, 2x7, Not Today    2. rotation snag, x5 ea, Not today    3. UT stretch, 2x30\"    4. LS stretch, 2x30\"    5. Supine chin tuck, 3x30\" hold, cueing for straight back pt flex neck    6. Wall Ys, x15, cueing for decreased UT activation    7. scapular squeezes, 2x10, vc for decreased UT activation    20. POC 5/13/24 to 7/8/24      Therapeutic Exercise Summary: HEP:  Chin tuck, UT stretch, LS stretch, scapular squeezes, Y's on wall     Therapeutic Treatments and Modalities:     1. Manual Therapy (CPT 43165), see below    2. Therapeutic Activities (CPT 49744), see below    Therapeutic Treatment and Modalities Summary: Upglides bilaterally  C3-C7, gd  UT STM/trigger point  LS STM/trigger point  Suboccipital distraction     Ther Act:   Lifting mechanics from floor to waist. Emphasis on flat back and using the legs to get closer to the ground then coming to stand. Pt verbalized understanding of new lifting mechanics to use when lifting things off the floor like her dog.       Time-based treatments/modalities:    Physical Therapy Timed Treatment Charges  Therapeutic activity minutes (CPT 15942): 13 minutes  Therapeutic exercise minutes (CPT 56915): 30 " minutes        ASSESSMENT:   Response to treatment: Pt responded well to today's treatment with increase c/s mobility following manual therapy. Progressed pt scapular strengthening and lower trap activation with moderate vc to complete. Education given on sitting posture to decrease stress on neck and upper back. Patient still requires skilled physical therapy to improve functional activity tolerance.     PLAN/RECOMMENDATIONS:   Plan for treatment: therapy treatment to continue next visit.  Planned interventions for next visit: continue with current treatment.      [x] As the licensed therapist supervising this student, I was present during the entire treatment session directing the care and reviewing the assessment plan.  I reviewed all documentation prior to signing.

## 2024-06-04 ENCOUNTER — PHYSICAL THERAPY (OUTPATIENT)
Dept: PHYSICAL THERAPY | Facility: REHABILITATION | Age: 74
End: 2024-06-04
Attending: NURSE PRACTITIONER
Payer: MEDICARE

## 2024-06-04 DIAGNOSIS — M54.12 CHRONIC CERVICAL RADICULOPATHY: ICD-10-CM

## 2024-06-04 PROCEDURE — 97110 THERAPEUTIC EXERCISES: CPT

## 2024-06-04 PROCEDURE — 97140 MANUAL THERAPY 1/> REGIONS: CPT

## 2024-06-04 NOTE — OP THERAPY DAILY TREATMENT
"  Outpatient Physical Therapy  DAILY TREATMENT     [facility] Physical [address].  [address]  [place] [address]  Phone:  [phone]  Fax:  [phone]    Date: 06/04/2024    Patient: Yuly Gotti  YOB: 1950  MRN: [id number]     Time Calculation    Start time: 1042  Stop time: 1115 Time Calculation (min): 33 minutes         Chief Complaint: Neck Problem    Visit #: 4    SUBJECTIVE:  Pt was 12 minutes late to appointment. Pt reports she is doing a lot better. 0/10 pain currently. Pt reports 5/10 pain in the evening.     OBJECTIVE:  Current objective measures:           Therapeutic Exercises (CPT 36748):     1. Lower c/s extension snag, 2x7, Not Today    2. rotation snag, x5 ea, Not today    3. UT stretch, 2x30\"    4. LS stretch, 2x30\"    5. Supine chin tuck, 8 mins, cueing for straight back pt flex neck    6. Wall Ys, x15, cueing for decreased UT activation    7. scapular squeezes, 2x10, vc for decreased UT activation    8. MONA w/ chin tucks, to fatigue, vc for chin tuck form while completing exercise    20. POC 5/13/24 to 7/8/24      Therapeutic Exercise Summary: HEP:  Chin tuck, UT stretch, LS stretch, scapular squeezes, Y's on wall     Therapeutic Treatments and Modalities:     1. Manual Therapy (CPT 85181), see below    Therapeutic Treatment and Modalities Summary: Upglides bilaterally  C3-C7, gd Not today   UT STM/trigger point  LS STM/trigger point  Infraspinatus STM/trigger point    Suboccipital distraction     Ther Act: Not today  Lifting mechanics from floor to waist. Emphasis on flat back and using the legs to get closer to the ground then coming to stand. Pt verbalized understanding of new lifting mechanics to use when lifting things off the floor like her dog.       Time-based treatments/modalities:    Physical Therapy Timed Treatment Charges  Manual therapy minutes (CPT 35381): 10 minutes  Therapeutic exercise minutes (CPT 46300): 22 minutes          ASSESSMENT: "   Response to treatment: Pt responded well to today's treatment with decrease in pain following manual therapy. Pt still requires extensive vc, tc, and demo for correct chin tuck form and  exercises. Skilled physical therapy is still required to improve functional activity tolerance and reports of pain.     PLAN/RECOMMENDATIONS:   Plan for treatment: therapy treatment to continue next visit.  Planned interventions for next visit: continue with current treatment.      [x] As the licensed therapist supervising this student, I was present during the entire treatment session directing the care and reviewing the assessment plan.  I reviewed all documentation prior to signing.

## 2024-06-11 ENCOUNTER — PHYSICAL THERAPY (OUTPATIENT)
Dept: PHYSICAL THERAPY | Facility: REHABILITATION | Age: 74
End: 2024-06-11
Attending: NURSE PRACTITIONER
Payer: MEDICARE

## 2024-06-11 DIAGNOSIS — M54.12 CHRONIC CERVICAL RADICULOPATHY: ICD-10-CM

## 2024-06-11 PROCEDURE — 97110 THERAPEUTIC EXERCISES: CPT

## 2024-06-11 PROCEDURE — 97140 MANUAL THERAPY 1/> REGIONS: CPT

## 2024-06-11 NOTE — OP THERAPY PROGRESS SUMMARY
Outpatient Physical Therapy  PROGRESS SUMMARY NOTE      [facility] Physical Therapy [address].  [address]  [place] [address]  Phone:  [phone]  Fax:  [phone]    Date of Visit: 06/11/2024    Patient: Yuly Gotti  YOB: 1950  MRN: [id number]     Referring Provider: NUP[address]  [place] [address]   Referring Diagnosis Radiculopathy, cervical region [M54.12]     Visit Diagnoses     ICD-10-CM   1. Chronic cervical radiculopathy  M54.12       Rehab Potential: good    Progress Report Period: 5/13/2024-6/11/2024    Functional Assessment Used  Neck Disability Total: 8       Objective Findings and Assessment:   Patient progression towards goals: Pt demo overall functional improvements. Demo increased AROM of c/s, increased DNF endurance but with poor control,  increased accessory motion of the c/s. Impairments continue to include increased vc/tc for proper DNF engagement, increased hypertonicity of UT/LS. The patient will benefit from continued skilled therapy with focus on strength and ROM deficits in order to increase participation with daily tasks. The pt states she  understands and agrees to the POC.    Objective findings and assessment details: DNF endurance: 14 seconds     Palpation:   UT L & R Hypertonic and trigger point   LS L & R Hypertonci and trigger point      Central PA [place]        C3: hypomobile and painful       C4: hypomobile       C7: hypomobile        C8: hypomobile   AROM C/s  L rot: 60 dg  R rot: 60 dg      Goals:   Short Term Goals:   1. Pt will be compliant with HEP 3-5x per week for improving ROM, strength and decreasing pain -Met   2. Pt will report ability to sit 30 minutes with neck pain no greater than 2/10 in order to complete daily tasks- Met       Short term goal time span:  1-2 weeks      Long Term Goals:    1. Pt will demonstrate improved DNF endurance with 20 second improvement or better from assessment -  Partially   2. Pt will demo c/s ROM WFL in order to complete ADLs - Partially met   3. Pt will demo ability to lift 10# at waist level without pain in order to complete daily activities -Met     Long term goal time span:  4-6 weeks    Plan:   Planned therapy interventions:  Neuromuscular Re-education (CPT 59128), Manual Therapy (CPT 80980), E Stim Unattended (CPT 13498), Therapeutic Activities (CPT 52876), Therapeutic Exercise (CPT 42879) and Self Care ADL Training (CPT 76678)  Frequency:  2x week  Duration in weeks:  4  Duration in visits:  8      Referring provider co-signature:  I have reviewed this plan of care and my co-signature certifies the need for services.     Certification Period: 06/11/2024 to 07/30/24    Physician Signature: ________________________________ Date: ______________       [x] As the licensed therapist supervising this student, I was present during the entire treatment session directing the care and reviewing the assessment plan.  I reviewed all documentation prior to signing.         Satisfactory

## 2024-06-11 NOTE — OP THERAPY DAILY TREATMENT
"  Outpatient Physical Therapy  DAILY TREATMENT     [facility] Physical [address].  [address]  [place] [address]  Phone:  [phone]  Fax:  [phone]    Date: 06/11/2024    Patient: Yuly Gotti  YOB: 1950  MRN: [id number]     Time Calculation    Start time: 1030  Stop time: 1115 Time Calculation (min): 45 minutes         Chief Complaint: Neck Problem    Visit #: 5    SUBJECTIVE:  Pt reports she has less pain in the neck but it is better than the beginning. Pt reports muscle soreness in the LT area from exercises.    OBJECTIVE:  Current objective measures: See progress note.  Neck Disability Total: 8       Therapeutic Exercises (CPT 86188):     1. Lower c/s extension snag, 2x7, Not Today    2. rotation snag, x5 ea, Not today    3. UT stretch, 2x30\"    4. LS stretch, 2x30\"    5. Supine chin tuck, 10x10\", cueing for straight back pt flex neck    6. Wall Ys, x15, cueing for decreased UT activation and chin tucks    7. scapular squeezes, 2x10, Not today    8. MONA w/ chin tucks, to fatigue, vc for chin tuck form while completing exercise    9. Seated t/s ext on roll up mat, x20    10. Objective measures, see PN      Therapeutic Exercise Summary: HEP:  Chin tuck, UT stretch, LS stretch, scapular squeezes, Y's on wall     Therapeutic Treatments and Modalities:     1. Manual Therapy (CPT 04092), see below    Therapeutic Treatment and Modalities Summary: Upglides bilaterally  C3-C7 grade 3-4  UT STM/trigger point  LS STM/trigger point        Time-based treatments/modalities:    Physical Therapy Timed Treatment Charges  Manual therapy minutes (CPT 88049): 15 minutes  Therapeutic exercise minutes (CPT 42302): 30 minutes          ASSESSMENT:   Response to treatment: See progress note.    PLAN/RECOMMENDATIONS:   Plan for treatment: therapy treatment to continue next visit.  Planned interventions for next visit: continue with current treatment.      [x] As the licensed therapist " supervising this student, I was present during the entire treatment session directing the care and reviewing the assessment plan.  I reviewed all documentation prior to signing.

## 2024-06-25 ENCOUNTER — PHYSICAL THERAPY (OUTPATIENT)
Dept: PHYSICAL THERAPY | Facility: REHABILITATION | Age: 74
End: 2024-06-25
Attending: NURSE PRACTITIONER
Payer: MEDICARE

## 2024-06-25 DIAGNOSIS — M54.12 CHRONIC CERVICAL RADICULOPATHY: ICD-10-CM

## 2024-06-25 PROCEDURE — 97140 MANUAL THERAPY 1/> REGIONS: CPT

## 2024-06-25 PROCEDURE — 97110 THERAPEUTIC EXERCISES: CPT

## 2024-06-25 NOTE — OP THERAPY DAILY TREATMENT
"  Outpatient Physical Therapy  DAILY TREATMENT     [facility] Physical [address].  [address]  [place] [address]  Phone:  [phone]  Fax:  [phone]    Date: 06/25/2024    Patient: Yuly Gotti  YOB: 1950  MRN: [id number]     Time Calculation    Start time: 1030  Stop time: 1110 Time Calculation (min): 40 minutes         Chief Complaint: Neck Pain    Visit #: 6    SUBJECTIVE:  Pt reports her neck is feeling a lot better. Pt reports no pain but a little discomfort. Pt reports completing her HEP has helped a lot. Pt reports she is about 80% back to normal.     OBJECTIVE:  Current objective measures:           Therapeutic Exercises (CPT 53856):     1. Lower c/s extension snag, 2x7, Not Today    2. rotation snag, x5 ea, Not today    3. UT stretch, 2x30\"    4. LS stretch, 2x30\"    5. Supine chin tuck, 10x10\", Not today    6. Wall Ys, x15, Not today    7. scapular squeezes, 2x10    8. MONA w/ chin tucks, to fatigue, vc for chin tuck form while completing exercise    9. Seated t/s ext on roll up mat, x20    10. Chin tuck with ball on wall, 10x10\"    11. lax ball on wall, 3', on trigger points in UT/LS    12. shoulder ext, to fatigue, PTB      Therapeutic Exercise Summary: HEP:  Chin tuck, UT stretch, LS stretch, scapular squeezes, Y's on wall     Therapeutic Treatments and Modalities:     1. Manual Therapy (CPT 11866), see below    Therapeutic Treatment and Modalities Summary: UT STM/trigger point  LS STM/trigger point  T/s paraspinals STM        Time-based treatments/modalities:    Physical Therapy Timed Treatment Charges  Manual therapy minutes (CPT 12971): 10 minutes  Therapeutic exercise minutes (CPT 18552): 30 minutes          ASSESSMENT:   Response to treatment: Pt tolerated treatment well today. Pt demo improved chin tuck form after using balloon behind head. Pt also reported decrease pain following manual therapy to the c/s and t/s. Continue with postural muscle " strengthening. Pt will still benefit from skilled physical therapy in order to improve functional activity tolerance.     PLAN/RECOMMENDATIONS:   Plan for treatment: therapy treatment to continue next visit.  Planned interventions for next visit: continue with current treatment.      [x] As the licensed therapist supervising this student, I was present during the entire treatment session directing the care and reviewing the assessment plan.  I reviewed all documentation prior to signing.

## 2024-07-02 ENCOUNTER — PHYSICAL THERAPY (OUTPATIENT)
Dept: PHYSICAL THERAPY | Facility: REHABILITATION | Age: 74
End: 2024-07-02
Attending: NURSE PRACTITIONER
Payer: MEDICARE

## 2024-07-02 DIAGNOSIS — M54.12 CHRONIC CERVICAL RADICULOPATHY: ICD-10-CM

## 2024-07-02 PROCEDURE — 97140 MANUAL THERAPY 1/> REGIONS: CPT

## 2024-07-02 PROCEDURE — 97110 THERAPEUTIC EXERCISES: CPT

## 2024-07-09 ENCOUNTER — PHYSICAL THERAPY (OUTPATIENT)
Dept: PHYSICAL THERAPY | Facility: REHABILITATION | Age: 74
End: 2024-07-09
Attending: NURSE PRACTITIONER
Payer: MEDICARE

## 2024-07-09 DIAGNOSIS — M54.12 CHRONIC CERVICAL RADICULOPATHY: ICD-10-CM

## 2024-07-09 PROCEDURE — 97140 MANUAL THERAPY 1/> REGIONS: CPT

## 2024-07-09 PROCEDURE — 97110 THERAPEUTIC EXERCISES: CPT

## 2024-07-31 RX ORDER — TRIAMCINOLONE ACETONIDE 1 MG/G
OINTMENT TOPICAL
Qty: 45 G | Refills: 3 | Status: SHIPPED | OUTPATIENT
Start: 2024-07-31

## 2024-08-06 ENCOUNTER — TELEPHONE (OUTPATIENT)
Dept: ENDOCRINOLOGY | Facility: MEDICAL CENTER | Age: 74
End: 2024-08-06
Payer: MEDICARE

## 2024-08-06 NOTE — TELEPHONE ENCOUNTER
With the Help of [employer]  [id number] we were able to leave a voicemail to remind the patient of their appt in 2 weeks on 8/20/24 and that have labs due prior to that appt

## 2024-08-08 ENCOUNTER — HOSPITAL ENCOUNTER (OUTPATIENT)
Dept: LAB | Facility: MEDICAL CENTER | Age: 74
End: 2024-08-08
Attending: INTERNAL MEDICINE
Payer: MEDICARE

## 2024-08-08 DIAGNOSIS — E06.3 HASHIMOTO'S DISEASE: ICD-10-CM

## 2024-08-08 DIAGNOSIS — E22.2 SIADH (SYNDROME OF INAPPROPRIATE ADH PRODUCTION) (HCC): ICD-10-CM

## 2024-08-08 DIAGNOSIS — E87.1 HYPONATREMIA: ICD-10-CM

## 2024-08-08 DIAGNOSIS — R73.03 PREDIABETES: ICD-10-CM

## 2024-08-08 LAB
25(OH)D3 SERPL-MCNC: 29 NG/ML (ref 30–100)
ALBUMIN SERPL BCP-MCNC: 4.4 G/DL (ref 3.2–4.9)
ALBUMIN/GLOB SERPL: 1.6 G/DL
ALP SERPL-CCNC: 53 U/L (ref 30–99)
ALT SERPL-CCNC: 16 U/L (ref 2–50)
ANION GAP SERPL CALC-SCNC: 13 MMOL/L (ref 7–16)
AST SERPL-CCNC: 22 U/L (ref 12–45)
BILIRUB SERPL-MCNC: 0.5 MG/DL (ref 0.1–1.5)
BUN SERPL-MCNC: 9 MG/DL (ref 8–22)
CALCIUM ALBUM COR SERPL-MCNC: 9 MG/DL (ref 8.5–10.5)
CALCIUM SERPL-MCNC: 9.3 MG/DL (ref 8.5–10.5)
CHLORIDE SERPL-SCNC: 93 MMOL/L (ref 96–112)
CO2 SERPL-SCNC: 25 MMOL/L (ref 20–33)
CREAT SERPL-MCNC: 0.34 MG/DL (ref 0.5–1.4)
EST. AVERAGE GLUCOSE BLD GHB EST-MCNC: 117 MG/DL
GFR SERPLBLD CREATININE-BSD FMLA CKD-EPI: 107 ML/MIN/1.73 M 2
GLOBULIN SER CALC-MCNC: 2.8 G/DL (ref 1.9–3.5)
GLUCOSE SERPL-MCNC: 96 MG/DL (ref 65–99)
HBA1C MFR BLD: 5.7 % (ref 4–5.6)
POTASSIUM SERPL-SCNC: 4 MMOL/L (ref 3.6–5.5)
PROT SERPL-MCNC: 7.2 G/DL (ref 6–8.2)
SODIUM SERPL-SCNC: 131 MMOL/L (ref 135–145)
T4 FREE SERPL-MCNC: 1.17 NG/DL (ref 0.93–1.7)
TSH SERPL-ACNC: 2.36 UIU/ML (ref 0.35–5.5)

## 2024-08-08 PROCEDURE — 84443 ASSAY THYROID STIM HORMONE: CPT

## 2024-08-08 PROCEDURE — 84439 ASSAY OF FREE THYROXINE: CPT

## 2024-08-08 PROCEDURE — 80053 COMPREHEN METABOLIC PANEL: CPT

## 2024-08-08 PROCEDURE — 83036 HEMOGLOBIN GLYCOSYLATED A1C: CPT

## 2024-08-08 PROCEDURE — 82306 VITAMIN D 25 HYDROXY: CPT

## 2024-08-08 PROCEDURE — 36415 COLL VENOUS BLD VENIPUNCTURE: CPT

## 2024-09-04 ENCOUNTER — PATIENT MESSAGE (OUTPATIENT)
Dept: HEALTH INFORMATION MANAGEMENT | Facility: OTHER | Age: 74
End: 2024-09-04

## 2024-09-16 ENCOUNTER — DOCUMENTATION (OUTPATIENT)
Dept: HEALTH INFORMATION MANAGEMENT | Facility: OTHER | Age: 74
End: 2024-09-16
Payer: MEDICARE

## (undated) DEVICE — KIT ROOM DECONTAMINATION

## (undated) DEVICE — CLIP MED INTNL HRZN TI ESCP - (25/BX)

## (undated) DEVICE — GOWN SURGEONS X-LARGE - DISP. (30/CA)

## (undated) DEVICE — ELECTRODE DUAL RETURN W/ CORD - (50/PK)

## (undated) DEVICE — GLOVE BIOGEL SZ 7 SURGICAL PF LTX - (50PR/BX 4BX/CA)

## (undated) DEVICE — CLIP SM INTNL HRZN TI ESCP LGT - (24EA/PK 25PK/BX)

## (undated) DEVICE — MASK ANESTHESIA ADULT  - (100/CA)

## (undated) DEVICE — VESSELOOP MAXI BLUE STERILE- SURG-I-LOOP (10EA/BX)

## (undated) DEVICE — GLOVE BIOGEL INDICATOR SZ 8 SURGICAL PF LTX - (50/BX 4BX/CA)

## (undated) DEVICE — DRESSING TRANSPARENT FILM TEGADERM 4 X 4.75" (50EA/BX)"

## (undated) DEVICE — SUTURE 2-0 VICRYL PLUS SH - 8 X 18 INCH (12/BX)

## (undated) DEVICE — HEAD HOLDER JUNIOR/ADULT

## (undated) DEVICE — SET LEADWIRE 5 LEAD BEDSIDE DISPOSABLE ECG (1SET OF 5/EA)

## (undated) DEVICE — SYRINGE DISP. 12 CC LL - (100/BX)

## (undated) DEVICE — DRAPE SURGICAL U 77X120 - (10/CA)

## (undated) DEVICE — BLADE SURGICAL #15 - (50/BX 3BX/CA)

## (undated) DEVICE — SUTURE GENERAL

## (undated) DEVICE — GLOVE BIOGEL SZ 7.5 SURGICAL PF LTX - (50PR/BX 4BX/CA)

## (undated) DEVICE — VESSELOOP MINI BLUE STERILE - SURG-I-LOOP (10EA/BX)

## (undated) DEVICE — LACTATED RINGERS INJ 1000 ML - (14EA/CA 60CA/PF)

## (undated) DEVICE — PACK MINOR BASIN - (2EA/CA)

## (undated) DEVICE — SUTURE 7-0 PROLENE BV175-6 (36PK/BX)

## (undated) DEVICE — NEPTUNE 4 PORT MANIFOLD - (20/PK)

## (undated) DEVICE — SUCTION INSTRUMENT YANKAUER BULBOUS TIP W/O VENT (50EA/CA)

## (undated) DEVICE — SPONGE GAUZESTER. 2X2 4-PL - (2/PK 50PK/BX 30BX/CS)

## (undated) DEVICE — SUTURE 3-0 VICRYL PLUS SH - 27 INCH (36/BX)

## (undated) DEVICE — BAG DECANTER (50EA/CS)

## (undated) DEVICE — SUTURE 3-0 ETHILON FS-1 - (36/BX) 30 INCH

## (undated) DEVICE — TRAY SKIN SCRUB PVP WET (20EA/CA) PART #DYND70356 DISCONTINUED

## (undated) DEVICE — SENSOR SPO2 NEO LNCS ADHESIVE (20/BX) SEE USER NOTES

## (undated) DEVICE — ADHESIVE DERMABOND HVD MINI (12EA/BX)

## (undated) DEVICE — PROTECTOR ULNA NERVE - (36PR/CA)

## (undated) DEVICE — KIT ANESTHESIA W/CIRCUIT & 3/LT BAG W/FILTER (20EA/CA)

## (undated) DEVICE — PAD PREP 24 X 48 CUFFED - (100/CA)

## (undated) DEVICE — SUTURE 4-0 MONOCRYL PLUS PS-2 - 27 INCH (36/BX)

## (undated) DEVICE — SYRINGE 20 ML LL (50EA/BX 4BX/CA)

## (undated) DEVICE — SUTURE 7-0 PROLENE BV-1 D/A 24 (36PK/BX)"

## (undated) DEVICE — CATHETER EMBOLECTOMY 2FR (5EA/CA)

## (undated) DEVICE — TOWELS CLOTH SURGICAL - (4/PK 20PK/CA)

## (undated) DEVICE — TUBING CLEARLINK DUO-VENT - C-FLO (48EA/CA)

## (undated) DEVICE — GLOVE BIOGEL INDICATOR SZ 7.5 SURGICAL PF LTX - (50PR/BX 4BX/CA)

## (undated) DEVICE — BLADE SURGICAL #11 - (50/BX)

## (undated) DEVICE — SLEEVE, VASO, THIGH, MED

## (undated) DEVICE — GOWN WARMING STANDARD FLEX - (30/CA)

## (undated) DEVICE — SUTURE CV

## (undated) DEVICE — SODIUM CHL IRRIGATION 0.9% 1000ML (12EA/CA)

## (undated) DEVICE — DRAPE LARGE 3 QUARTER - (20/CA)

## (undated) DEVICE — SODIUM CHL. INJ. 0.9% 500ML (24EA/CA 50CA/PF)

## (undated) DEVICE — SET EXTENSION WITH 2 PORTS (48EA/CA) ***PART #2C8610 IS A SUBSTITUTE*****

## (undated) DEVICE — CANISTER SUCTION 3000ML MECHANICAL FILTER AUTO SHUTOFF MEDI-VAC NONSTERILE LF DISP  (40EA/CA)

## (undated) DEVICE — ELECTRODE 850 FOAM ADHESIVE - HYDROGEL RADIOTRNSPRNT (50/PK)